# Patient Record
Sex: FEMALE | Race: BLACK OR AFRICAN AMERICAN | NOT HISPANIC OR LATINO | Employment: OTHER | URBAN - METROPOLITAN AREA
[De-identification: names, ages, dates, MRNs, and addresses within clinical notes are randomized per-mention and may not be internally consistent; named-entity substitution may affect disease eponyms.]

---

## 2017-01-30 DIAGNOSIS — G40.901 STATUS EPILEPTICUS: ICD-10-CM

## 2017-01-30 RX ORDER — VIGABATRIN 50 MG/ML
POWDER, FOR SOLUTION ORAL
Refills: 5 | OUTPATIENT
Start: 2017-01-30

## 2017-02-01 ENCOUNTER — TELEPHONE (OUTPATIENT)
Dept: NEUROLOGY | Facility: CLINIC | Age: 27
End: 2017-02-01

## 2017-02-01 NOTE — TELEPHONE ENCOUNTER
----- Message from Tali Ramos sent at 2/1/2017 11:03 AM CST -----  Contact: shari([facility]) [phone]   Shari is calling to get a new prescription for vigabatrin (SABRIL) 500 mg PwPk.pls call

## 2017-02-02 NOTE — TELEPHONE ENCOUNTER
----- Message from Michelle Cabrera MA sent at 2/2/2017  1:40 PM CST -----  Contact: [facility]      ----- Message -----     From: Ruth Samuel     Sent: 2/2/2017   1:06 PM       To: Yuridia GARCIA Staff    Requesting verbal on medication vigabatrin (SABRIL) 500 mg PwPk\    Call: [phone] option 3,2

## 2017-02-21 RX ORDER — LACOSAMIDE 200 MG/1
TABLET, FILM COATED ORAL
Qty: 30 TABLET | OUTPATIENT
Start: 2017-02-21

## 2017-02-23 ENCOUNTER — TELEPHONE (OUTPATIENT)
Dept: NEUROLOGY | Facility: CLINIC | Age: 27
End: 2017-02-23

## 2017-02-23 NOTE — TELEPHONE ENCOUNTER
----- Message from Jessica Fonseca sent at 2/23/2017 11:37 AM CST -----  Contact[facility] [place] [phone]  Pharmacy is calling to get a status on a refill for patients VIMPAT 200 mg Tab.

## 2017-03-07 RX ORDER — CLOBAZAM 20 MG/1
TABLET ORAL
Qty: 60 TABLET | Status: CANCELLED | OUTPATIENT
Start: 2017-03-07

## 2017-03-09 ENCOUNTER — TELEPHONE (OUTPATIENT)
Dept: NEUROLOGY | Facility: CLINIC | Age: 27
End: 2017-03-09

## 2017-03-09 NOTE — TELEPHONE ENCOUNTER
Spoke to Deann at [facility] and refilled Onfi 20mg BID, with 5 refills, per Dr Shi.

## 2017-03-09 NOTE — TELEPHONE ENCOUNTER
----- Message from Michelle Cabrera MA sent at 3/9/2017 11:19 AM CST -----  Contact: soniya([facility]) [phone]      ----- Message -----     From: Tali Ramos     Sent: 3/9/2017  10:36 AM       To: Yuridia GARCIA Staff    soniya is calling for a refill of ONFI 20 mg Tab.pls call

## 2017-04-20 ENCOUNTER — OFFICE VISIT (OUTPATIENT)
Dept: FAMILY MEDICINE | Facility: CLINIC | Age: 27
End: 2017-04-20
Payer: MEDICARE

## 2017-04-20 VITALS
HEART RATE: 94 BPM | WEIGHT: 218.94 LBS | SYSTOLIC BLOOD PRESSURE: 120 MMHG | HEIGHT: 63 IN | TEMPERATURE: 99 F | OXYGEN SATURATION: 98 % | DIASTOLIC BLOOD PRESSURE: 76 MMHG | BODY MASS INDEX: 38.79 KG/M2

## 2017-04-20 DIAGNOSIS — R63.5 WEIGHT GAIN: ICD-10-CM

## 2017-04-20 DIAGNOSIS — Z51.81 THERAPEUTIC DRUG MONITORING: ICD-10-CM

## 2017-04-20 DIAGNOSIS — G40.909 SEIZURE DISORDER: ICD-10-CM

## 2017-04-20 DIAGNOSIS — Z00.00 ROUTINE HEALTH MAINTENANCE: Primary | ICD-10-CM

## 2017-04-20 DIAGNOSIS — N94.6 DYSMENORRHEA: ICD-10-CM

## 2017-04-20 PROCEDURE — 99395 PREV VISIT EST AGE 18-39: CPT | Mod: S$GLB,,, | Performed by: FAMILY MEDICINE

## 2017-04-20 NOTE — MR AVS SNAPSHOT
"    [facility]  [address] [place], [place] [address] [place] [address]  Phone: [phone]  Fax: [phone]                  Deanna Miles   2017 9:00 AM   Office Visit    Description:  Female : 1990   Provider:  Hermilo Macario MD   Department:  [facility]           Reason for Visit     Annual Exam           Diagnoses this Visit        Comments    Routine health maintenance    -  Primary     Dysmenorrhea                To Do List           Goals (5 Years of Data)     None      [facility] On Call     [facility] On Call Nurse Care Line -  Assistance  Unless otherwise directed by your provider, please contact [facility], our nurse care line that is available for  assistance.     Registered nurses in the [facility] provide: appointment scheduling, clinical advisement, health education, and other advisory services.  Call: [phone] (toll free)               Medications           Message regarding Medications     Verify the changes and/or additions to your medication regime listed below are the same as discussed with your clinician today.  If any of these changes or additions are incorrect, please notify your healthcare provider.             Verify that the below list of medications is an accurate representation of the medications you are currently taking.  If none reported, the list may be blank. If incorrect, please contact your healthcare provider. Carry this list with you in case of emergency.           Current Medications     ONFI 20 mg Tab Take 1 tablet (20 mg total) by mouth 2 (two) times daily.    vigabatrin (SABRIL) 500 mg PwPk 3 each (1,500 mg total) by Per G Tube route 2 (two) times daily.    VIMPAT 200 mg Tab TAKE 1/2 TABLET BY MOUTH 2 TIMES A DAY           Clinical Reference Information           Your Vitals Were     BP Pulse Temp Height Weight SpO2    120/76 94 98.5 °F (36.9 °C) (Oral) 5' 3" (1.6 m) 99.3 kg (218 lb 14.7 oz) 98%    BMI             "    38.78 kg/m2          Blood Pressure          Most Recent Value    BP  120/76      Allergies as of 4/20/2017     Claritin [Loratadine]    Milk Containing Products      Immunizations Administered on Date of Encounter - 4/20/2017     None      Orders Placed During Today's Visit      Normal Orders This Visit    Ambulatory referral to Obstetrics / Gynecology       [facility] Sign-Up     Activating your [facility] account is as easy as 1-2-3!     1) Visit [email or web address], select Sign Up Now, enter this activation code and your date of birth, then select Next.  [email or web address]  Expires: 6/4/2017  9:54 AM      2) Create a username and password to use when you visit [facility] in the future and select a security question in case you lose your password and select Next.    3) Enter your e-mail address and click Sign Up!    Additional Information  If you have questions, please e-mail [email or web address] or call [phone] to talk to our [facility] staff. Remember, [facility] is NOT to be used for urgent needs. For medical emergencies, dial 911.         Language Assistance Services     ATTENTION: Language assistance services are available, free of charge. Please call [phone].      ATENCIÓN: Si juan hazel, tiene a jimenes disposición servicios gratuitos de asistencia lingüística. Llame al [phone].     CHÚ Ý: N?u b?n nói Ti?ng Vi?t, có các d?ch v? h? tr? ngôn ng? mi?n phí dành cho b?n. G?i s? [phone].         [facility] complies with applicable Federal civil rights laws and does not discriminate on the basis of race, color, national origin, age, disability, or sex.

## 2017-04-21 ENCOUNTER — TELEPHONE (OUTPATIENT)
Dept: FAMILY MEDICINE | Facility: CLINIC | Age: 27
End: 2017-04-21

## 2017-04-21 LAB
ALBUMIN SERPL-MCNC: 3.9 G/DL (ref 3.6–5.1)
ALBUMIN/GLOB SERPL: 1.5 (CALC) (ref 1–2.5)
ALP SERPL-CCNC: 69 U/L (ref 33–115)
ALT SERPL-CCNC: 6 U/L (ref 6–29)
AST SERPL-CCNC: 14 U/L (ref 10–30)
BASOPHILS # BLD AUTO: 20 CELLS/UL (ref 0–200)
BASOPHILS NFR BLD AUTO: 0.3 %
BILIRUB SERPL-MCNC: 0.2 MG/DL (ref 0.2–1.2)
BUN SERPL-MCNC: 6 MG/DL (ref 7–25)
BUN/CREAT SERPL: 10 (CALC) (ref 6–22)
CALCIUM SERPL-MCNC: 8.9 MG/DL (ref 8.6–10.2)
CHLORIDE SERPL-SCNC: 106 MMOL/L (ref 98–110)
CHOLEST SERPL-MCNC: 149 MG/DL (ref 125–200)
CHOLEST/HDLC SERPL: 4 (CALC)
CO2 SERPL-SCNC: 26 MMOL/L (ref 20–31)
CREAT SERPL-MCNC: 0.6 MG/DL (ref 0.5–1.1)
EOSINOPHIL # BLD AUTO: 99 CELLS/UL (ref 15–500)
EOSINOPHIL NFR BLD AUTO: 1.5 %
ERYTHROCYTE [DISTWIDTH] IN BLOOD BY AUTOMATED COUNT: 12.8 % (ref 11–15)
GFR SERPL CREATININE-BSD FRML MDRD: 127 ML/MIN/1.73M2
GLOBULIN SER CALC-MCNC: 2.6 G/DL (CALC) (ref 1.9–3.7)
GLUCOSE SERPL-MCNC: 86 MG/DL (ref 65–99)
HCT VFR BLD AUTO: 37.6 % (ref 35–45)
HDLC SERPL-MCNC: 37 MG/DL
HGB BLD-MCNC: 12.6 G/DL (ref 11.7–15.5)
LDLC SERPL CALC-MCNC: 93 MG/DL (CALC)
LYMPHOCYTES # BLD AUTO: 1637 CELLS/UL (ref 850–3900)
LYMPHOCYTES NFR BLD AUTO: 24.8 %
MCH RBC QN AUTO: 30.5 PG (ref 27–33)
MCHC RBC AUTO-ENTMCNC: 33.6 G/DL (ref 32–36)
MCV RBC AUTO: 90.7 FL (ref 80–100)
MONOCYTES # BLD AUTO: 475 CELLS/UL (ref 200–950)
MONOCYTES NFR BLD AUTO: 7.2 %
NEUTROPHILS # BLD AUTO: 4369 CELLS/UL (ref 1500–7800)
NEUTROPHILS NFR BLD AUTO: 66.2 %
NONHDLC SERPL-MCNC: 112 MG/DL (CALC)
PLATELET # BLD AUTO: 366 THOUSAND/UL (ref 140–400)
PMV BLD REES-ECKER: 8.7 FL (ref 7.5–12.5)
POTASSIUM SERPL-SCNC: 4 MMOL/L (ref 3.5–5.3)
PROLACTIN SERPL-MCNC: 11.6 NG/ML
PROT SERPL-MCNC: 6.5 G/DL (ref 6.1–8.1)
RBC # BLD AUTO: 4.14 MILLION/UL (ref 3.8–5.1)
SODIUM SERPL-SCNC: 139 MMOL/L (ref 135–146)
T4 FREE SERPL-MCNC: 0.8 NG/DL (ref 0.8–1.8)
TRIGL SERPL-MCNC: 97 MG/DL
TSH SERPL-ACNC: 1.65 MIU/L
WBC # BLD AUTO: 6.6 THOUSAND/UL (ref 3.8–10.8)

## 2017-04-21 NOTE — TELEPHONE ENCOUNTER
Informed patient's mother, Mrs. Viv Page, that lab work was normal.     Mrs. Page says she scheduled Ms. Miles to see Dr. Haines in [place] but if something in [place] is available she will take it. I informed her that Dr. Haines's staff will be in contact with her.

## 2017-04-21 NOTE — TELEPHONE ENCOUNTER
Please call her mother Mrs. Nam and let her know that Timothy's  blood work is normal.  I also sent a note to Dr. Haines-about seeing her and helping her with menstrual periods.

## 2017-04-21 NOTE — PROGRESS NOTES
Patient ID: Deanna Miles is a 26 y.o. female.    Chief Complaint: Annual Exam (90L form)    HPI      Deanna Milse is a 26 y.o. female  here for annual exam.  Patient with profound autism-seizure disorder, significant vision loss and history of GI bleed in the distant past.  Currently she has any weight recently without significant dietary increase.  Eats really only she is given has some neck symptoms with meals consisting effort            Review of Symptoms    Constitutional  Neg activity change, No chills /fever   Resp  Neg hemoptysis, stridor, choking  [facility]  Neg chest pain, palpitations    Physical Exam    Constitutional:    Sitting comfortably-will not talkative but will respond to questions not always appropriate.  No distress.      HENT  Head:  Mild cranial deformity and atraumatic  Right Ear: External ear normal.   Left Ear: External ear normal.   Nose: External nose normal.   Mouth:  Moist mucus membranes.    Eyes:  Conjunctivae are normal. Right eye exhibits no discharge.  Left eye exhibits no discharge. No scleral icterus.  No periorbital edema    Cardiovascular:  Regular rate, Regular rhythm     No extrasystole  Normal S1-S2      Pulmonary/Chest:   Normal effort and breath sounds.  No wheezing, rhonchi or rales.    Musculoskeletal:  No edema.  No waisting   contractions distal upper extremity   Continued voluntary movement    Neurological: Patient is alert oriented to person  Abnormal coordination     Skin:   Skin is warm and dry.  No diaphoresis.   No rash noted.     Psychiatric: Normal mood and affect. Behavior is normal.  Judgment and thought content normal.       Assessment / Plan:      ICD-10-CM ICD-9-CM   1. Routine health maintenance Z00.00 V70.0   2. Dysmenorrhea N94.6 625.3   3. Seizure disorder G40.909 345.90   4. Weight gain R63.5 783.1   5. Therapeutic drug monitoring Z51.81 V58.83     Routine health maintenance  -     Ambulatory referral to Obstetrics / Gynecology  -     [facility] auto  differential; Future; Expected date: 4/20/17  -     Comprehensive metabolic panel; Future; Expected date: 4/20/17  -     TSH; Future  -     Lipid panel; Future; Expected date: 4/20/17  -     Prolactin; Future; Expected date: 4/20/17  -     T4, free; Future; Expected date: 4/20/17    Dysmenorrhea  -     Ambulatory referral to Obstetrics / Gynecology  -     CBC auto differential; Future; Expected date: 4/20/17  -     Comprehensive metabolic panel; Future; Expected date: 4/20/17  -     TSH; Future  -     Lipid panel; Future; Expected date: 4/20/17  -     Prolactin; Future; Expected date: 4/20/17  -     T4, free; Future; Expected date: 4/20/17    Seizure disorder  -     CBC auto differential; Future; Expected date: 4/20/17  -     Comprehensive metabolic panel; Future; Expected date: 4/20/17  -     TSH; Future  -     Lipid panel; Future; Expected date: 4/20/17  -     Prolactin; Future; Expected date: 4/20/17  -     T4, free; Future; Expected date: 4/20/17    Weight gain  -     CBC auto differential; Future; Expected date: 4/20/17  -     Comprehensive metabolic panel; Future; Expected date: 4/20/17  -     TSH; Future  -     Lipid panel; Future; Expected date: 4/20/17  -     Prolactin; Future; Expected date: 4/20/17  -     T4, free; Future; Expected date: 4/20/17    Therapeutic drug monitoring  -     CBC auto differential; Future; Expected date: 4/20/17  -     Comprehensive metabolic panel; Future; Expected date: 4/20/17  -     TSH; Future  -     Lipid panel; Future; Expected date: 4/20/17  -     Prolactin; Future; Expected date: 4/20/17  -     T4, free; Future; Expected date: 4/20/17

## 2017-04-21 NOTE — TELEPHONE ENCOUNTER
Jesus,    I referred this young lady to you.  She has profound autism and menstrual periods cause a fair amount problems because of her lack of understanding.  In discussions with her caregiver, she was on Depo-Provera but continued to have spotting so it was discontinued.  I sent her to you for evaluation and consideration of other methods to control menstrual cycle.  I mentioned Mirena but this may need to be done under some sedation.    Thank you      Eleuterio

## 2017-04-23 LAB — LACOSAMIDE SERPL-MCNC: 3.3 MCG/ML

## 2017-04-26 ENCOUNTER — OFFICE VISIT (OUTPATIENT)
Dept: OBSTETRICS AND GYNECOLOGY | Facility: CLINIC | Age: 27
End: 2017-04-26
Payer: MEDICARE

## 2017-04-26 VITALS
BODY MASS INDEX: 39.16 KG/M2 | SYSTOLIC BLOOD PRESSURE: 118 MMHG | WEIGHT: 221 LBS | HEIGHT: 63 IN | DIASTOLIC BLOOD PRESSURE: 74 MMHG

## 2017-04-26 DIAGNOSIS — Z30.014 ENCOUNTER FOR INITIAL PRESCRIPTION OF INTRAUTERINE CONTRACEPTIVE DEVICE (IUD): Primary | ICD-10-CM

## 2017-04-26 PROCEDURE — 99203 OFFICE O/P NEW LOW 30 MIN: CPT | Mod: S$GLB,,, | Performed by: OBSTETRICS & GYNECOLOGY

## 2017-04-26 PROCEDURE — 99499 UNLISTED E&M SERVICE: CPT | Mod: S$PBB,,, | Performed by: OBSTETRICS & GYNECOLOGY

## 2017-04-26 PROCEDURE — 99999 PR PBB SHADOW E&M-EST. PATIENT-LVL II: CPT | Mod: PBBFAC,,, | Performed by: OBSTETRICS & GYNECOLOGY

## 2017-04-26 PROCEDURE — 1160F RVW MEDS BY RX/DR IN RCRD: CPT | Mod: S$GLB,,, | Performed by: OBSTETRICS & GYNECOLOGY

## 2017-04-26 NOTE — PROGRESS NOTES
PT here to discuss other birth control options. Pt is severely autistic and will not ann exam w/o anesthesia. Mon is with pt and didn't like wt gain with depo so has been off of ti for > 1yr. Pt does not handle vb with cycles well and mom is requesting a mirena. Mom want to try the Mirena  No h/o STD's/ PID, never been sexually active        Will order Mirena and place once rec'd. Pt will call to schedule   will send Urine for GC/CT  At preop visit

## 2017-05-03 ENCOUNTER — TELEPHONE (OUTPATIENT)
Dept: FAMILY MEDICINE | Facility: CLINIC | Age: 27
End: 2017-05-03

## 2017-05-03 NOTE — TELEPHONE ENCOUNTER
----- Message from Earline Braxton sent at 5/3/2017  1:15 PM CDT -----  Contact: Eleanor Joel/ [phone]  Please see 90 L form in media listed under Medical information form 04/20/2017. Nurse needs clarity on diagnosis written on the form. (states she can't make out what's written). Please call

## 2017-05-03 NOTE — TELEPHONE ENCOUNTER
i called taylor arora and luis antonio was not available  i re faxed form with clarification written on it to [phone]

## 2017-05-05 ENCOUNTER — PATIENT MESSAGE (OUTPATIENT)
Dept: OBSTETRICS AND GYNECOLOGY | Facility: CLINIC | Age: 27
End: 2017-05-05

## 2017-05-08 ENCOUNTER — TELEPHONE (OUTPATIENT)
Dept: FAMILY MEDICINE | Facility: CLINIC | Age: 27
End: 2017-05-08

## 2017-05-08 NOTE — TELEPHONE ENCOUNTER
"----- Message from Josefina Portillo sent at 5/8/2017  1:56 PM CDT -----  Contact: Eleanor briggs/Sneha Joel # [phone]  Says she received the 90L paperwork. Page 2 has a note saying "see attached snap shot" but there is no snap shot attached. Please fax to [phone]  "

## 2017-05-16 ENCOUNTER — OFFICE VISIT (OUTPATIENT)
Dept: OPTOMETRY | Facility: CLINIC | Age: 27
End: 2017-05-16
Payer: MEDICARE

## 2017-05-16 ENCOUNTER — TELEPHONE (OUTPATIENT)
Dept: OBSTETRICS AND GYNECOLOGY | Facility: CLINIC | Age: 27
End: 2017-05-16

## 2017-05-16 DIAGNOSIS — Z79.899 ENCOUNTER FOR LONG-TERM (CURRENT) USE OF HIGH-RISK MEDICATION: ICD-10-CM

## 2017-05-16 DIAGNOSIS — H10.13 ALLERGIC CONJUNCTIVITIS, BILATERAL: Primary | ICD-10-CM

## 2017-05-16 PROCEDURE — 99999 PR PBB SHADOW E&M-EST. PATIENT-LVL II: CPT | Mod: PBBFAC,,, | Performed by: OPTOMETRIST

## 2017-05-16 PROCEDURE — 92014 COMPRE OPH EXAM EST PT 1/>: CPT | Mod: S$GLB,,, | Performed by: OPTOMETRIST

## 2017-05-16 RX ORDER — FLUTICASONE PROPIONATE 50 MCG
1 SPRAY, SUSPENSION (ML) NASAL DAILY
Qty: 9.9 BOTTLE | Refills: 6
Start: 2017-05-16 | End: 2018-03-09

## 2017-05-16 NOTE — MR AVS SNAPSHOT
[facility]  [address]  [place] [address]  Phone: [phone]                  Deanna Miles   2017 3:00 PM   Office Visit    Description:  Female : 1990   Provider:  Santy Lobo OD   Department:  [facility]           Reason for Visit     Concerns About Ocular Health           Diagnoses this Visit        Comments    Allergic conjunctivitis, bilateral    -  Primary     Encounter for long-term (current) use of high-risk medication                To Do List           Goals (5 Years of Data)     None       These Medications        Disp Refills Start End    fluticasone (FLONASE) 50 mcg/actuation nasal spray 9.9 Bottle 6 2017     1 spray by Each Nare route once daily. - Each Nare    Pharmacy: [facility]/pharmacy #[id number] - [place] - [address] AT [facility] Ph #: [phone]         [facility] On Call     [facility] On Call Nurse Care Line -  Assistance  Unless otherwise directed by your provider, please contact [facility], our nurse care line that is available for  assistance.     Registered nurses in the [facility] provide: appointment scheduling, clinical advisement, health education, and other advisory services.  Call: [phone] (toll free)               Medications           Message regarding Medications     Verify the changes and/or additions to your medication regime listed below are the same as discussed with your clinician today.  If any of these changes or additions are incorrect, please notify your healthcare provider.        START taking these NEW medications        Refills    fluticasone (FLONASE) 50 mcg/actuation nasal spray 6    Si spray by Each Nare route once daily.    Class: No Print    Route: Each Nare           Verify that the below list of medications is an accurate representation of the medications you are currently taking.  If none reported, the list may be blank. If  incorrect, please contact your healthcare provider. Carry this list with you in case of emergency.           Current Medications     ONFI 20 mg Tab Take 1 tablet (20 mg total) by mouth 2 (two) times daily.    VIMPAT 200 mg Tab TAKE 1/2 TABLET BY MOUTH 2 TIMES A DAY    fluticasone (FLONASE) 50 mcg/actuation nasal spray 1 spray by Each Nare route once daily.    vigabatrin (SABRIL) 500 mg PwPk 3 each (1,500 mg total) by Per G Tube route 2 (two) times daily.           Clinical Reference Information           Your Vitals Were     Last Period                   04/05/2017           Allergies as of 5/16/2017     Claritin [Loratadine]    Milk Containing Products      Immunizations Administered on Date of Encounter - 5/16/2017     None      Language Assistance Services     ATTENTION: Language assistance services are available, free of charge. Please call [phone].      ATENCIÓN: Si juan hazel, tiene a jimenes disposición servicios gratuitos de asistencia lingüística. Llame al [phone].     BARRY Ý: N?u b?n nói Ti?ng Vi?t, có các d?ch v? h? tr? ngôn ng? mi?n phí dành cho b?n. G?i s? [phone].         [facility] complies with applicable Federal civil rights laws and does not discriminate on the basis of race, color, national origin, age, disability, or sex.

## 2017-05-16 NOTE — TELEPHONE ENCOUNTER
----- Message from Kierra Reid sent at 5/15/2017  1:25 PM CDT -----  Contact: [facility] [phone]   Pharmacy would like to speak with you about prior authorization for patient's Mirena IUD . Prior auth can be obtained by calling [phone].  Please advise.

## 2017-05-17 NOTE — TELEPHONE ENCOUNTER
----- Message from Yris Holt sent at 5/17/2017  9:22 AM CDT -----  Contact: [phone]  Pt requesting a nurse call back in regards to her next step. Please advise.

## 2017-05-19 ENCOUNTER — TELEPHONE (OUTPATIENT)
Dept: OBSTETRICS AND GYNECOLOGY | Facility: CLINIC | Age: 27
End: 2017-05-19

## 2017-05-19 NOTE — TELEPHONE ENCOUNTER
----- Message from Erin Potts sent at 5/19/2017 10:39 AM CDT -----  Contact: Paola from [facility] [phone]  Paola from [facility] would like prior authorization of Selena. [facility] would like call [phone] for authorization. Please advise.

## 2017-05-30 ENCOUNTER — TELEPHONE (OUTPATIENT)
Dept: OBSTETRICS AND GYNECOLOGY | Facility: CLINIC | Age: 27
End: 2017-05-30

## 2017-05-30 NOTE — TELEPHONE ENCOUNTER
----- Message from Tiffany Hagen sent at 5/30/2017  1:19 PM CDT -----  Contact: ms. renee [phone]  This is regarding patient's IUD  ----- Message -----  From: Mariama Lyon  Sent: 5/30/2017  12:48 PM  To: Tiffany Hagen    Ms renee would like to speak with you regarding insurance approval for patient's procedure

## 2017-06-01 ENCOUNTER — TELEPHONE (OUTPATIENT)
Dept: OBSTETRICS AND GYNECOLOGY | Facility: CLINIC | Age: 27
End: 2017-06-01

## 2017-06-02 NOTE — TELEPHONE ENCOUNTER
----- Message from Tiffany Hagen sent at 6/2/2017 11:32 AM CDT -----  Contact: Self [phone]      ----- Message -----  From: Elvira Leary  Sent: 6/2/2017  11:27 AM  To: Tiffany Hagen    Patients mother is calling to talk to you concerning some paper work she has to sign, please advice

## 2017-07-05 ENCOUNTER — TELEPHONE (OUTPATIENT)
Dept: OBSTETRICS AND GYNECOLOGY | Facility: CLINIC | Age: 27
End: 2017-07-05

## 2017-07-11 ENCOUNTER — TELEPHONE (OUTPATIENT)
Dept: OBSTETRICS AND GYNECOLOGY | Facility: CLINIC | Age: 27
End: 2017-07-11

## 2017-07-17 ENCOUNTER — TELEPHONE (OUTPATIENT)
Dept: OBSTETRICS AND GYNECOLOGY | Facility: CLINIC | Age: 27
End: 2017-07-17

## 2017-07-26 ENCOUNTER — TELEPHONE (OUTPATIENT)
Dept: OBSTETRICS AND GYNECOLOGY | Facility: CLINIC | Age: 27
End: 2017-07-26

## 2017-07-26 NOTE — TELEPHONE ENCOUNTER
----- Message from Cassandra Nicole sent at 7/25/2017 12:35 PM CDT -----  Contact: [facility]/ [phone]  Pharmacy would like to speak with you about the application for the IUD assistance program. Pharmacy also needs the patient's income and how many people live in the household. Fax number is [phone]. Please advise.

## 2017-07-31 ENCOUNTER — TELEPHONE (OUTPATIENT)
Dept: OBSTETRICS AND GYNECOLOGY | Facility: CLINIC | Age: 27
End: 2017-07-31

## 2017-07-31 NOTE — TELEPHONE ENCOUNTER
----- Message from James Valdes sent at 7/31/2017  2:14 PM CDT -----  Contact: Brandi briggs/[facility] [phone]  ID#[id number]  Pharmacy states this is their 2nd call.  Pharmacy would like to speak with you about the application for the IUD assistance program. Please advise.

## 2017-08-24 RX ORDER — LACOSAMIDE 200 MG/1
TABLET, FILM COATED ORAL
Qty: 30 TABLET | OUTPATIENT
Start: 2017-08-24

## 2017-08-29 ENCOUNTER — TELEPHONE (OUTPATIENT)
Dept: NEUROLOGY | Facility: CLINIC | Age: 27
End: 2017-08-29

## 2017-09-21 ENCOUNTER — LAB VISIT (OUTPATIENT)
Dept: LAB | Facility: HOSPITAL | Age: 27
End: 2017-09-21
Attending: PSYCHIATRY & NEUROLOGY
Payer: MEDICARE

## 2017-09-21 ENCOUNTER — OFFICE VISIT (OUTPATIENT)
Dept: NEUROLOGY | Facility: CLINIC | Age: 27
End: 2017-09-21
Payer: MEDICARE

## 2017-09-21 VITALS
DIASTOLIC BLOOD PRESSURE: 78 MMHG | BODY MASS INDEX: 37.15 KG/M2 | HEIGHT: 63 IN | HEART RATE: 101 BPM | SYSTOLIC BLOOD PRESSURE: 114 MMHG | WEIGHT: 209.69 LBS

## 2017-09-21 DIAGNOSIS — N39.0 CHRONIC UTI (URINARY TRACT INFECTION): ICD-10-CM

## 2017-09-21 DIAGNOSIS — N39.0 CHRONIC UTI (URINARY TRACT INFECTION): Primary | ICD-10-CM

## 2017-09-21 DIAGNOSIS — R56.9 SEIZURE: Primary | ICD-10-CM

## 2017-09-21 LAB
BACTERIA #/AREA URNS AUTO: ABNORMAL /HPF
BILIRUB UR QL STRIP: NEGATIVE
CLARITY UR REFRACT.AUTO: ABNORMAL
COLOR UR AUTO: YELLOW
GLUCOSE UR QL STRIP: NEGATIVE
HGB UR QL STRIP: ABNORMAL
HYALINE CASTS UR QL AUTO: 0 /LPF
KETONES UR QL STRIP: NEGATIVE
LEUKOCYTE ESTERASE UR QL STRIP: ABNORMAL
MICROSCOPIC COMMENT: ABNORMAL
NITRITE UR QL STRIP: NEGATIVE
PH UR STRIP: 5 [PH] (ref 5–8)
PROT UR QL STRIP: ABNORMAL
RBC #/AREA URNS AUTO: 3 /HPF (ref 0–4)
SP GR UR STRIP: 1.03 (ref 1–1.03)
SQUAMOUS #/AREA URNS AUTO: 12 /HPF
URN SPEC COLLECT METH UR: ABNORMAL
UROBILINOGEN UR STRIP-ACNC: NEGATIVE EU/DL
WBC #/AREA URNS AUTO: 92 /HPF (ref 0–5)
WBC CLUMPS UR QL AUTO: ABNORMAL

## 2017-09-21 PROCEDURE — 99999 PR PBB SHADOW E&M-EST. PATIENT-LVL III: CPT | Mod: PBBFAC,,, | Performed by: PSYCHIATRY & NEUROLOGY

## 2017-09-21 PROCEDURE — 99499 UNLISTED E&M SERVICE: CPT | Mod: S$PBB,,, | Performed by: PSYCHIATRY & NEUROLOGY

## 2017-09-21 PROCEDURE — 3008F BODY MASS INDEX DOCD: CPT | Mod: S$GLB,,, | Performed by: PSYCHIATRY & NEUROLOGY

## 2017-09-21 PROCEDURE — 81001 URINALYSIS AUTO W/SCOPE: CPT

## 2017-09-21 PROCEDURE — 99214 OFFICE O/P EST MOD 30 MIN: CPT | Mod: S$GLB,,, | Performed by: PSYCHIATRY & NEUROLOGY

## 2017-09-21 NOTE — PROGRESS NOTES
"Progress Notes                                       INTERVAL HISTORY:    9/21/17:   Only occasional small staring spell  No significant sz  Mom worried she may have UTI now  Doing well with sz control so far      3/9/16:  Doing great as far as seizure control.  Has been gaining a lot of weight.  Otherwise no complaints        CURRENT meds:  Sabril 1500mg BID  LCM 200mg BID  Onfi 20mg BID     _______________________________________________________________________________________     Laureen Brown MD at 7/15/2014 11:32 AM   Two ED Visits 5/31/14 and 6/2/14 for recurrent seizure activity.     Currently on:  Sabril 1500mg BID  LCM 200mg BID  LEV [phone]  7/7/14: 18     Pt very lethargic during the daytime.        4/09/14  Mom increased sabril. On LEV and LCM.  No more seizures per mom  Mom complains of insomnia- pt had problem with this before.     Sabril 1500mg BID  LCM 200mg BID  LEV [phone]     EEG showed bifrontal slowing        2/20/14  During [facility] stay, the neurologist weaned her off the onfi and decreased her sabril to 1000mg/day.     She had episodes of shaking, eyes roving, head turn to the left after discharge from rehab. Three days after d/c, she pulled her feeding tube out and had seizures in the [place] ER. She didn't receive any benzos in the ER. Mom put her back on two packets BID (500mg in each packet). Yesterday, she had another seizure for 2 minutes.      Patient with insomnia at night. She takes quick naps during the day.      Seen by ophthalmology- couldn't find note. Normal exam reportedly.      Current AED:  Sabril 1000mg BID  LCM 200mg BID  LEV 1000 mg qid     HISTORY OF PRESENT ILLNESS (HPI):   "The patient is a 23 y.o. yo right handed AA woman with autism and a remote history of epilepsy who is being evaluated for NCSE. Consultation was requested by Dr. Olivo.   Ms. Miles developed seizures at 1 month. She was placed on PB and continued to have seizures (febrile?). Last known " "seizure was at the age of 6. Her adoptive mother says that seizure type consisted of head jerking back and biting on her lip. She was weaned of PB at the age of 6, but did nto have recurrence since that time.   Her mother noticed staring spells that began in sep 2013. Ms. Miles is non-verbal at baseline, but communicates with non-verbal gestures. During these spells, she would stare off without any associated automatisms. These lasted 1 minute and occurred 1x/week.   Yesterday at 6pm, the patient had three seizure clusters at home (generalized likely) and had two more in the hospital. She was given valium and ativan, intubated and transferred to [facility]. Her first seizure began with hand fumbling and decreased interaction, this then progressed to head and extremity jerking.   At the OSH, she had an LP (normal)."     During the hospitalization she was started on onfi, sabril, viimpat and keppra. She also was on ketamine for seizure control.           Allergies    Allergen  Reactions      Milk Containing Products  Diarrhea              Current Medications            Current Outpatient Prescriptions    Medication  Sig  Dispense  Refill      lacosamide (VIMPAT) 200 mg Tab  Take 1 tablet (200 mg total) by mouth 2 (two) times daily.  60 tablet  11      levetiracetam (KEPPRA) 1000 MG tablet  Take 1000mg by mouth in the morning   Take 2000mg by mouth in the evening  120 tablet  11      MELATONIN ORAL  Take by mouth.          vigabatrin (SABRIL) 500 mg PwPk  3 each (1,500 mg total) by Per G Tube route 2 (two) times daily.  180 each  11    No current facility-administered medications for this visit.                Past Medical History    Diagnosis  Date      Autism disorder        Seizures        Encounter for blood transfusion        Strabismus               Past Surgical History    Procedure  Laterality  Date      Strabismus surgery                 Family History    Problem  Relation  Age of Onset      Diabetes  " Father        Hypertension  Father        Cataracts  Maternal Aunt        Diabetes  Paternal Aunt        Diabetes  Paternal Uncle        Diabetes  Paternal Grandmother        Hypertension  Paternal Grandmother        Amblyopia  Neg Hx        Blindness  Neg Hx        Cancer  Neg Hx        Glaucoma  Neg Hx        Macular degeneration  Neg Hx        Retinal detachment  Neg Hx        Strabismus  Neg Hx        Stroke  Neg Hx        Thyroid disease  Neg Hx                Social History               History    Social History      Marital Status:  Single        Spouse Name:  N/A        Number of Children:  N/A      Years of Education:  N/A    Occupational History       Not on file.     Social History Main Topics      Smoking status:  Never Smoker      Smokeless tobacco:  Not on file      Alcohol Use:  No      Drug Use:  No      Sexually Active:  Not on file    Other Topics  Concern        Not on file      Social History Narrative      22 yo Female developmental delayedNon-verbal                   Filed Vitals:      07/15/14 1116    BP:  120/79    Pulse:  79          Higher Cortical Function:   Patient is a well developed, pleasant, well groomed individual appearing their stated age   Awake, alert, smiles  Doesn't follow any commands  Cranial Nerves II - XII:   EOMs with exotropia in left eye  PERRLA. D/C Funduscopic exam - unable to perform,  Blinks to threat  Motor - facial movement was symmetrical and normal.   Motor: Power, bulk and tone were decreased in all extremities.   Sensory: responds to LT  Coordination:   Unable to assess.   Gait: gait is slow an wide-based.  Deep tendon reflexes: 2+ throughtout  Tremor: resting, postural, intentional - none   Pulses   Carotids - strong without bruits   Peripheral - strong and symmetrical   CV- RRR      3/22/14 EEG routine:  Findings   The patient's background consists of a posteriorly dominant 11 Hz alpha rhythm, which is moderately well formed,  moderately well modulated, and abolishes with eye opening. Anteriorly, the patient's background consists of admixed alpha, beta, and theta range frequencies. Episodes lasting up to 5 seconds of 6 Hz relatively high amplitude slowing are appreciated bifrontally.   During the course of the recording, the patient is noted to be awake, and subsequently becomes drowsy. Normal sleep architecture is not appreciated.   Hyperventilation and photic stimulation were not performed.   There are no focal, lateralized, or epileptiform transients appreciated in this record.   EKG demonstrates sinus rhythm.   Interpretation   This is an abnormal EEG due to the presence of episodic bifrontal slowing. 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      Impression  Refractory seizures, unow well controlled on sabril, LCM, Onfi  Sabril needs to be continued to prevent aggressive seizures (only drug that stopped her status)  EEG routine c/w generalized slow  MRI shows left MTS - would not repeat MRI at this time     History of status epilepticus, NCSE     Autism, moderate-severe MR     Plan:  1. Continue vimpat 200mg twice a day  2. Continue Sabril 1500mg twice a day  3. Continue Onfi 20mg BID  4. RTC 6 months  5. Check labs, U/A

## 2017-09-21 NOTE — LETTER
September 21, 2017      Hermilo Macario MD  [address] [facility] [address]           [facility] - Neurology Epilepsy  [address], 7th [address]  [facility] [address]  Phone: [phone]  Fax: [phone]          Patient: Deanna Miles   MR Number: [id number]   YOB: 1990   Date of Visit: 9/21/2017       Dear Dr. Hermilo Macario:    Thank you for referring Deanna Miles to me for evaluation. Attached you will find relevant portions of my assessment and plan of care.    If you have questions, please do not hesitate to call me. I look forward to following Deanna Miles along with you.    Sincerely,    Sedrick Shi MD    Enclosure  CC:  No Recipients    If you would like to receive this communication electronically, please contact [email or web address] or [phone] to request more information on [employer] Link access.    For providers and/or their staff who would like to refer a patient to [facility], please contact us through our one-stop-shop provider referral line, [facility], at [phone].    If you feel you have received this communication in error or would no longer like to receive these types of communications, please e-mail [email or web address]

## 2017-09-22 ENCOUNTER — PATIENT MESSAGE (OUTPATIENT)
Dept: FAMILY MEDICINE | Facility: CLINIC | Age: 27
End: 2017-09-22

## 2017-09-22 RX ORDER — AMOXICILLIN 500 MG/1
500 TABLET, FILM COATED ORAL EVERY 12 HOURS
Qty: 20 TABLET | Refills: 0 | Status: SHIPPED | OUTPATIENT
Start: 2017-09-22 | End: 2017-10-02

## 2017-10-06 RX ORDER — CLOBAZAM 20 MG/1
TABLET ORAL
Qty: 60 TABLET | Status: CANCELLED | OUTPATIENT
Start: 2017-10-06

## 2017-10-10 ENCOUNTER — TELEPHONE (OUTPATIENT)
Dept: NEUROLOGY | Facility: CLINIC | Age: 27
End: 2017-10-10

## 2017-10-10 NOTE — TELEPHONE ENCOUNTER
----- Message from Holly Scott sent at 10/10/2017  2:52 PM CDT -----  Contact: [facility] specialty pharmacy     [phone] option 3  Calling to request a new prescription for vigabatrin (SABRIL) 500 mg in pill form.  pls call with a verbal or fax to [phone].

## 2017-10-13 ENCOUNTER — NURSE TRIAGE (OUTPATIENT)
Dept: ADMINISTRATIVE | Facility: CLINIC | Age: 27
End: 2017-10-13

## 2017-10-13 NOTE — TELEPHONE ENCOUNTER
"    Reason for Disposition   [1] Request for URGENT new prescription or refill of "essential" medication (i.e., likelihood of harm to patient if not taken) AND [2] triager unable to fill per unit policy    Protocols used: ST MEDICATION QUESTION CALL-A-    Caregiver called for refill of ONFI. Dr. Albrecht on call provider notified via Pc. Mother and Dr. Albrecht conferenced in on call together with triager. Mother request if she cant get refill can she break pill in half. MD states ok. Patient instructed to call Neurology clinic on Monday for refill. Can you please advise patient on refill?   "

## 2017-10-20 ENCOUNTER — TELEPHONE (OUTPATIENT)
Dept: NEUROLOGY | Facility: CLINIC | Age: 27
End: 2017-10-20

## 2017-10-20 NOTE — TELEPHONE ENCOUNTER
[facility] Sabril [facility], mother requested tablets instead of powder packets.

## 2017-10-20 NOTE — TELEPHONE ENCOUNTER
----- Message from Katiuska Parsons sent at 10/20/2017 10:31 AM CDT -----  Contact: Elvira, [facility]  Ms. Felix is calling regarding a new prescription of Sabril.  Mother is requesting tablets, in lieu of the powder.  The order is scheduled for Tuesday, 10/24/17.    She can be reached at [phone], Option 3 on the Menu.  Fax # is:  [phone].    Thank you.

## 2017-12-01 ENCOUNTER — PATIENT MESSAGE (OUTPATIENT)
Dept: OBSTETRICS AND GYNECOLOGY | Facility: CLINIC | Age: 27
End: 2017-12-01

## 2017-12-01 NOTE — TELEPHONE ENCOUNTER
Contacted pt. Pt. Mother answered and wanted to schedule an appointment for pt to get depo. Pt scheduled for 12/20/17 at 8:45. Patients mother verbalized understanding.

## 2017-12-22 ENCOUNTER — PATIENT MESSAGE (OUTPATIENT)
Dept: OBSTETRICS AND GYNECOLOGY | Facility: CLINIC | Age: 27
End: 2017-12-22

## 2018-02-28 ENCOUNTER — PATIENT MESSAGE (OUTPATIENT)
Dept: OBSTETRICS AND GYNECOLOGY | Facility: CLINIC | Age: 28
End: 2018-02-28

## 2018-02-28 ENCOUNTER — PATIENT MESSAGE (OUTPATIENT)
Dept: FAMILY MEDICINE | Facility: CLINIC | Age: 28
End: 2018-02-28

## 2018-03-09 ENCOUNTER — OFFICE VISIT (OUTPATIENT)
Dept: OBSTETRICS AND GYNECOLOGY | Facility: CLINIC | Age: 28
End: 2018-03-09
Payer: MEDICARE

## 2018-03-09 VITALS
SYSTOLIC BLOOD PRESSURE: 102 MMHG | HEIGHT: 63 IN | DIASTOLIC BLOOD PRESSURE: 74 MMHG | WEIGHT: 206.63 LBS | BODY MASS INDEX: 36.61 KG/M2

## 2018-03-09 DIAGNOSIS — Z30.9 ENCOUNTER FOR CONTRACEPTIVE MANAGEMENT, UNSPECIFIED TYPE: ICD-10-CM

## 2018-03-09 DIAGNOSIS — Z30.013 ENCOUNTER FOR INITIAL PRESCRIPTION OF INJECTABLE CONTRACEPTIVE: Primary | ICD-10-CM

## 2018-03-09 PROCEDURE — 99212 OFFICE O/P EST SF 10 MIN: CPT | Mod: 25,S$GLB,, | Performed by: OBSTETRICS & GYNECOLOGY

## 2018-03-09 PROCEDURE — 99999 PR PBB SHADOW E&M-EST. PATIENT-LVL III: CPT | Mod: PBBFAC,,, | Performed by: OBSTETRICS & GYNECOLOGY

## 2018-03-09 PROCEDURE — 96372 THER/PROPH/DIAG INJ SC/IM: CPT | Mod: S$GLB,,, | Performed by: OBSTETRICS & GYNECOLOGY

## 2018-03-09 RX ORDER — VIGABATRIN 500 MG/1
3 TABLET, FILM COATED ORAL 2 TIMES DAILY
COMMUNITY
Start: 2018-02-28 | End: 2019-04-01 | Stop reason: SDUPTHER

## 2018-03-09 RX ADMIN — MEDROXYPROGESTERONE ACETATE 150 MG: 150 INJECTION, SUSPENSION INTRAMUSCULAR at 09:03

## 2018-03-09 NOTE — PROGRESS NOTES
Chief complaint: Here for depot    Deanna Miles is 27 y.o. female  comes in for depot provera IM. Last injection was 1 1/2 year ago. Per mother, patient was previously on Depo but due to concerns of weight gain she stopped the shots. Mother states that the patient has heavy menstrual bleeding requiring her to miss days from school, therefore she would like to restart the depo. Unable to obtain UPT due to patient's developmental delay, LMP was on 18. Patient has no complaints.    Vitals:    18 0905   BP: 102/74       Procedure Note  1 milliliter(s) of depo-provera was injected IM to the hip. Patient tolerated procedure well.  Will return to clinic in 3 months for injection.  Opportunity was taken to discuss safe sex precautions, side effects of medications to watch for, and encouraged to take multivitamins daily.    Assessment  Depot provera injection    Plan  counseled on STD prevention, HIV risk factors and prevention and use and side effects of Depo  return 3 months   The use of the depo contraceptive has been fully discussed with the patient. This includes  the need for regular compliance to ensure adequate contraceptive effect, the physiology which make the shot effective, the instructions for what to do in event of a missed shot and warnings about anticipated minor side effects such as breakthrough spotting, nausea, breast tenderness, weight changes, acne, headaches, etc.  She has been told of the more serious potential side effects such as MI, stroke, and deep vein thrombosis, all of which are very unlikely.  She has been asked to report any signs of such serious problems immediately.  She should back up the shotwith a condom during any cycle in which antibiotics are prescribed, and during the first cycle as well. The need for additional protection, such as a condom, to prevent exposure to sexually transmitted diseases has also been discussed- the patient has been clearly reminded that  Depo cannot protect her against diseases such as HIV and others. She understands and wishes to take the medication as prescribed.

## 2018-03-27 RX ORDER — MEDROXYPROGESTERONE ACETATE 150 MG/ML
150 INJECTION, SUSPENSION INTRAMUSCULAR ONCE
Status: COMPLETED | OUTPATIENT
Start: 2018-03-09 | End: 2018-03-09

## 2018-03-27 NOTE — PROGRESS NOTES
.3/9/2018 @ 0920 Pt administered Medroxyprogesterone 150 mg IM to Left upper outer glut. Pt tolerated well, and instructed next injection due on  5/31/2017. Pt verbalizes understanding.     Lot#[id number]  Exp Date:5/2019  Man: Sanjay

## 2018-03-30 RX ORDER — LACOSAMIDE 200 MG/1
TABLET, FILM COATED ORAL
Qty: 30 TABLET | OUTPATIENT
Start: 2018-03-30

## 2018-04-03 ENCOUNTER — TELEPHONE (OUTPATIENT)
Dept: NEUROLOGY | Facility: CLINIC | Age: 28
End: 2018-04-03

## 2018-04-03 NOTE — TELEPHONE ENCOUNTER
----- Message from Holly Scott sent at 4/3/2018 11:16 AM CDT -----  Contact: tony    [facility]    [phone]  Calling for the status of pts refill request for VIMPAT 200 mg Tab.  Pls call.

## 2018-05-03 ENCOUNTER — OFFICE VISIT (OUTPATIENT)
Dept: FAMILY MEDICINE | Facility: CLINIC | Age: 28
End: 2018-05-03
Payer: MEDICARE

## 2018-05-03 VITALS
TEMPERATURE: 99 F | WEIGHT: 200.81 LBS | BODY MASS INDEX: 35.58 KG/M2 | DIASTOLIC BLOOD PRESSURE: 60 MMHG | SYSTOLIC BLOOD PRESSURE: 100 MMHG | HEART RATE: 121 BPM | HEIGHT: 63 IN | OXYGEN SATURATION: 96 %

## 2018-05-03 DIAGNOSIS — G40.909 SEIZURE DISORDER: Primary | ICD-10-CM

## 2018-05-03 PROCEDURE — 99499 UNLISTED E&M SERVICE: CPT | Mod: S$GLB,,, | Performed by: NURSE PRACTITIONER

## 2018-05-03 PROCEDURE — 99214 OFFICE O/P EST MOD 30 MIN: CPT | Mod: S$GLB,,, | Performed by: NURSE PRACTITIONER

## 2018-05-03 RX ORDER — CLOBAZAM 20 MG/1
20 TABLET ORAL 2 TIMES DAILY
Qty: 60 TABLET | Refills: 5 | Status: SHIPPED | OUTPATIENT
Start: 2018-05-03 | End: 2018-10-30

## 2018-05-03 RX ORDER — LACOSAMIDE 200 MG/1
200 TABLET ORAL DAILY
Qty: 30 TABLET | Refills: 5 | Status: SHIPPED | OUTPATIENT
Start: 2018-05-03 | End: 2018-12-27 | Stop reason: SDUPTHER

## 2018-06-07 ENCOUNTER — OFFICE VISIT (OUTPATIENT)
Dept: OBSTETRICS AND GYNECOLOGY | Facility: CLINIC | Age: 28
End: 2018-06-07
Payer: MEDICARE

## 2018-06-07 VITALS
DIASTOLIC BLOOD PRESSURE: 74 MMHG | HEIGHT: 63 IN | BODY MASS INDEX: 36.01 KG/M2 | WEIGHT: 203.25 LBS | SYSTOLIC BLOOD PRESSURE: 110 MMHG

## 2018-06-07 DIAGNOSIS — Z30.9 ENCOUNTER FOR CONTRACEPTIVE MANAGEMENT, UNSPECIFIED TYPE: Primary | ICD-10-CM

## 2018-06-07 PROCEDURE — 99212 OFFICE O/P EST SF 10 MIN: CPT | Mod: S$GLB,,, | Performed by: OBSTETRICS & GYNECOLOGY

## 2018-06-07 PROCEDURE — 99999 PR PBB SHADOW E&M-EST. PATIENT-LVL II: CPT | Mod: PBBFAC,,, | Performed by: OBSTETRICS & GYNECOLOGY

## 2018-06-07 PROCEDURE — 3008F BODY MASS INDEX DOCD: CPT | Mod: CPTII,S$GLB,, | Performed by: OBSTETRICS & GYNECOLOGY

## 2018-06-07 RX ORDER — MEDROXYPROGESTERONE ACETATE 150 MG/ML
150 INJECTION, SUSPENSION INTRAMUSCULAR
Qty: 1 ML | Refills: 3 | Status: SHIPPED | OUTPATIENT
Start: 2018-06-07 | End: 2019-06-18 | Stop reason: SDUPTHER

## 2018-06-07 NOTE — PROGRESS NOTES
Here for hayder  Doing well  rx sent to pharmacy next door  Pt will go next door to get shot  Refills sent in, ok to go straight there for shot q 3 months

## 2018-06-08 ENCOUNTER — CLINICAL SUPPORT (OUTPATIENT)
Dept: OBSTETRICS AND GYNECOLOGY | Facility: CLINIC | Age: 28
End: 2018-06-08
Payer: MEDICARE

## 2018-06-08 DIAGNOSIS — Z30.42 ENCOUNTER FOR DEPO-PROVERA CONTRACEPTION: Primary | ICD-10-CM

## 2018-06-08 PROCEDURE — 96372 THER/PROPH/DIAG INJ SC/IM: CPT | Mod: S$GLB,,, | Performed by: OBSTETRICS & GYNECOLOGY

## 2018-06-08 PROCEDURE — 99999 PR PBB SHADOW E&M-EST. PATIENT-LVL II: CPT | Mod: PBBFAC,,,

## 2018-06-08 RX ORDER — MEDROXYPROGESTERONE ACETATE 150 MG/ML
150 INJECTION, SUSPENSION INTRAMUSCULAR
Status: DISCONTINUED | OUTPATIENT
Start: 2018-06-08 | End: 2023-11-10

## 2018-06-08 RX ADMIN — MEDROXYPROGESTERONE ACETATE 150 MG: 150 INJECTION, SUSPENSION INTRAMUSCULAR at 09:06

## 2018-06-08 NOTE — PROGRESS NOTES
Administered Depo Provera 150 mg/ml @ 0937 06/08/2018  1 ml IM inj in R upper quad gluteus  Patient Tolerated well.  Patient instructed to return on 08/30/18 for next injection.   Patient verbalized understanding.   Lot: [id number]  Exp: 03/2020  Reviewed patient pain scale, allergies, medications, preffered pharmacy, fall risk, and advance directives verbally prior to injection.     Date of last pap/visit:No result found  Last Depo-Provera: 03/09/2018  UPT indicated?NO  Ordering Provider: Dr. Haines

## 2018-10-22 ENCOUNTER — TELEPHONE (OUTPATIENT)
Dept: OBSTETRICS AND GYNECOLOGY | Facility: CLINIC | Age: 28
End: 2018-10-22

## 2018-10-22 NOTE — TELEPHONE ENCOUNTER
Returned call patient was advised to go to [facility] for injection. Patient verbalized understanding.

## 2018-10-22 NOTE — TELEPHONE ENCOUNTER
----- Message from Dane Narayanan sent at 10/22/2018  2:03 PM CDT -----  Contact: Viv Page/mother/[phone]  Schedule depo at [place].

## 2018-10-23 RX ORDER — VIGABATRIN 500 MG/1
TABLET, FILM COATED ORAL
Qty: 180 TABLET | Refills: 10 | OUTPATIENT
Start: 2018-10-23

## 2018-11-02 ENCOUNTER — OFFICE VISIT (OUTPATIENT)
Dept: FAMILY MEDICINE | Facility: CLINIC | Age: 28
End: 2018-11-02
Payer: MEDICARE

## 2018-11-02 VITALS
TEMPERATURE: 98 F | DIASTOLIC BLOOD PRESSURE: 74 MMHG | HEIGHT: 64 IN | WEIGHT: 204.38 LBS | BODY MASS INDEX: 34.89 KG/M2 | OXYGEN SATURATION: 97 % | HEART RATE: 99 BPM | SYSTOLIC BLOOD PRESSURE: 100 MMHG

## 2018-11-02 DIAGNOSIS — Z00.00 ROUTINE HEALTH MAINTENANCE: Primary | ICD-10-CM

## 2018-11-02 DIAGNOSIS — N39.0 URINARY TRACT INFECTION WITHOUT HEMATURIA, SITE UNSPECIFIED: ICD-10-CM

## 2018-11-02 DIAGNOSIS — G40.909 SEIZURE DISORDER: ICD-10-CM

## 2018-11-02 DIAGNOSIS — R63.5 WEIGHT GAIN: ICD-10-CM

## 2018-11-02 DIAGNOSIS — Z51.81 THERAPEUTIC DRUG MONITORING: ICD-10-CM

## 2018-11-02 LAB
BILIRUB SERPL-MCNC: ABNORMAL MG/DL
BLOOD URINE, POC: ABNORMAL
COLOR, POC UA: YELLOW
GLUCOSE UR QL STRIP: ABNORMAL
KETONES UR QL STRIP: ABNORMAL
LEUKOCYTE ESTERASE URINE, POC: ABNORMAL
NITRITE, POC UA: POSITIVE
PH, POC UA: 5
PROTEIN, POC: ABNORMAL
SPECIFIC GRAVITY, POC UA: 1.01
UROBILINOGEN, POC UA: ABNORMAL

## 2018-11-02 PROCEDURE — 99213 OFFICE O/P EST LOW 20 MIN: CPT | Mod: 25,S$GLB,, | Performed by: FAMILY MEDICINE

## 2018-11-02 PROCEDURE — 81002 URINALYSIS NONAUTO W/O SCOPE: CPT | Mod: S$GLB,,, | Performed by: FAMILY MEDICINE

## 2018-11-02 RX ORDER — CIPROFLOXACIN 250 MG/1
250 TABLET, FILM COATED ORAL 2 TIMES DAILY
Qty: 10 TABLET | Refills: 0 | Status: SHIPPED | OUTPATIENT
Start: 2018-11-02 | End: 2019-07-23

## 2018-11-02 NOTE — PROGRESS NOTES
Patient ID: Deanna Miles is a 28 y.o. female.    Chief Complaint: 90L form and Urinary Tract Infection    HPI       Deanna Miles is a 28 y.o. female with autism seizure disorder here for yearly visit and fill out 90 L    Care taker describes urine as cloudy and that she goes to the bathroom frequently.      Review of Symptoms    Constitutional  Neg activity change, No chills/ fever   Resp  Neg hemoptysis, stridor, choking  [facility]  Neg chest pain, palpitations  Patient cannot provide review of systems caretaker describes no new problems      Physical Exam    Vitals:    11/02/18 1036   BP: 100/74   Pulse:    Temp:        Constitutional:   Patient is alert response to physical cues non verbal.   No distress.     HENT:   Head: Normocephalic and atraumatic.     Right Ear: Tympanic membrane, external ear and ear canal normal     Left Ear: Tympanic membrane, external ear and ear canal normal    Nose: External Normal. Normal turbinates, No rhinorrhea     Mouth/Throat: Uvula is midline, oropharynx is clear and moist and mucous membranes are normal.     Neck: supple no anterior cervical adenopathy      Eyes:   Conjunctivae are normal. Right eye exhibits no discharge. Left eye exhibits no discharge. No scleral icterus. No periorbital edema       Cardiovascular:  Regular rate and rhythm with normal S1 and S2     Pulmonary/Chest:   Clear to auscultation bilaterally without wheezes, rhonchi or rales      Musculoskeletal:  No edema.  No wasting     Neurological:   Alert and oriented to person, place, and time. Coordination normal.     Skin:   Skin is warm and dry.   No diaphoresis.   No rash noted.    Psychiatric: Normal mood and affect. Behavior is normal. Judgment and thought content normal.       Assessment / Plan:      ICD-10-CM ICD-9-CM   1. Routine health maintenance Z00.00 V70.0   2. Seizure disorder G40.909 345.90   3. Weight gain R63.5 783.1   4. Therapeutic drug monitoring Z51.81 V58.83   5. Urinary tract infection  without hematuria, site unspecified N39.0 599.0     Routine health maintenance    Seizure disorder    Weight gain    Therapeutic drug monitoring    Urinary tract infection without hematuria, site unspecified  -     Urinalysis; Future; Expected date: 11/02/2018  -     POCT urine dipstick without microscope    Other orders  -     ciprofloxacin HCl (CIPRO) 250 MG tablet; Take 1 tablet (250 mg total) by mouth 2 (two) times daily.  Dispense: 10 tablet; Refill: 0

## 2018-11-06 ENCOUNTER — TELEPHONE (OUTPATIENT)
Dept: NEUROLOGY | Facility: CLINIC | Age: 28
End: 2018-11-06

## 2018-12-06 RX ORDER — CLOBAZAM 20 MG/1
20 TABLET ORAL 2 TIMES DAILY
Qty: 60 TABLET | Refills: 5 | Status: SHIPPED | OUTPATIENT
Start: 2018-12-06 | End: 2019-05-14 | Stop reason: SDUPTHER

## 2018-12-27 RX ORDER — LACOSAMIDE 200 MG/1
200 TABLET ORAL DAILY
Qty: 30 TABLET | Refills: 5 | Status: SHIPPED | OUTPATIENT
Start: 2018-12-27 | End: 2019-09-09 | Stop reason: SDUPTHER

## 2019-03-18 ENCOUNTER — TELEPHONE (OUTPATIENT)
Dept: OBSTETRICS AND GYNECOLOGY | Facility: CLINIC | Age: 29
End: 2019-03-18

## 2019-03-18 NOTE — TELEPHONE ENCOUNTER
----- Message from Inez Ruiz sent at 3/18/2019 11:06 AM CDT -----  Contact: pharmacist Adrienne briggs/ [facility] [phone]  Patient is there to receive a depo injection and she needs authorization because the Rx was written June 2018. Patient is waiting. Please advise.

## 2019-03-20 ENCOUNTER — PATIENT MESSAGE (OUTPATIENT)
Dept: OBSTETRICS AND GYNECOLOGY | Facility: CLINIC | Age: 29
End: 2019-03-20

## 2019-03-20 ENCOUNTER — TELEPHONE (OUTPATIENT)
Dept: OBSTETRICS AND GYNECOLOGY | Facility: CLINIC | Age: 29
End: 2019-03-20

## 2019-03-20 DIAGNOSIS — Z30.9 ENCOUNTER FOR CONTRACEPTIVE MANAGEMENT, UNSPECIFIED TYPE: Primary | ICD-10-CM

## 2019-03-20 NOTE — TELEPHONE ENCOUNTER
----- Message from Nia Holt sent at 3/20/2019 10:41 AM CDT -----  Contact: [phone]/ pts talha Nam  Called in requesting UPT orders be placed so pt can have performed today at [facility] and get depo injection at local pharmacy. Please advise.

## 2019-03-20 NOTE — PROGRESS NOTES
Administered medroxyprogesterone 150 mg/ml on 3/20/19 at 2:35 pm in the right hip. UPT negative per lab on 3/20/19.  Lot#: [id number]  EXP: 4/30/2023  Patient should receive next injection between 6/5/2019-6/19/2019 in the left hip.

## 2019-04-01 ENCOUNTER — OFFICE VISIT (OUTPATIENT)
Dept: FAMILY MEDICINE | Facility: CLINIC | Age: 29
End: 2019-04-01
Payer: MEDICARE

## 2019-04-01 VITALS
OXYGEN SATURATION: 97 % | SYSTOLIC BLOOD PRESSURE: 100 MMHG | DIASTOLIC BLOOD PRESSURE: 80 MMHG | HEIGHT: 64 IN | BODY MASS INDEX: 35.38 KG/M2 | HEART RATE: 110 BPM | WEIGHT: 207.25 LBS

## 2019-04-01 DIAGNOSIS — R68.89 FLU-LIKE SYMPTOMS: ICD-10-CM

## 2019-04-01 DIAGNOSIS — J30.9 ALLERGIC RHINITIS WITH POSTNASAL DRIP: Primary | ICD-10-CM

## 2019-04-01 DIAGNOSIS — R09.82 ALLERGIC RHINITIS WITH POSTNASAL DRIP: Primary | ICD-10-CM

## 2019-04-01 DIAGNOSIS — G40.909 SEIZURE DISORDER: ICD-10-CM

## 2019-04-01 PROCEDURE — 99499 UNLISTED E&M SERVICE: CPT | Mod: S$GLB,,, | Performed by: NURSE PRACTITIONER

## 2019-04-01 PROCEDURE — 99213 PR OFFICE/OUTPT VISIT, EST, LEVL III, 20-29 MIN: ICD-10-PCS | Mod: S$GLB,,, | Performed by: NURSE PRACTITIONER

## 2019-04-01 PROCEDURE — 99213 OFFICE O/P EST LOW 20 MIN: CPT | Mod: S$GLB,,, | Performed by: NURSE PRACTITIONER

## 2019-04-01 PROCEDURE — 3008F BODY MASS INDEX DOCD: CPT | Mod: CPTII,S$GLB,, | Performed by: NURSE PRACTITIONER

## 2019-04-01 PROCEDURE — 99499 RISK ADDL DX/OHS AUDIT: ICD-10-PCS | Mod: S$GLB,,, | Performed by: NURSE PRACTITIONER

## 2019-04-01 PROCEDURE — 3008F PR BODY MASS INDEX (BMI) DOCUMENTED: ICD-10-PCS | Mod: CPTII,S$GLB,, | Performed by: NURSE PRACTITIONER

## 2019-04-01 RX ORDER — FLUTICASONE PROPIONATE 50 MCG
2 SPRAY, SUSPENSION (ML) NASAL DAILY
Qty: 16 G | Refills: 0 | Status: SHIPPED | OUTPATIENT
Start: 2019-04-01 | End: 2019-04-28 | Stop reason: SDUPTHER

## 2019-04-01 RX ORDER — VIGABATRIN 500 MG/1
1500 TABLET, FILM COATED ORAL 2 TIMES DAILY
Qty: 180 TABLET | Refills: 5 | Status: SHIPPED | OUTPATIENT
Start: 2019-04-01 | End: 2019-05-01 | Stop reason: SDUPTHER

## 2019-04-01 RX ORDER — CETIRIZINE HYDROCHLORIDE 10 MG/1
10 TABLET ORAL DAILY
Qty: 30 TABLET | Refills: 0 | Status: SHIPPED | OUTPATIENT
Start: 2019-04-01 | End: 2019-04-28 | Stop reason: SDUPTHER

## 2019-04-01 NOTE — PROGRESS NOTES
Subjective:       Patient ID: Deanna Miles is a 28 y.o. female.    Chief Complaint: Cough and Medication Refill    29 y/o female with history of autism and seizure disorder presents to clinic with mother for urgent care visit with c/o cough. Patient's mother states cough started 2 weeks ago. The patient has been taking [facility] Plus and mucinex without much improvement.    Patient's mother request refill for Sabril. Reports no seizure in > 6 months.  Cough   This is a new problem. The current episode started 1 to 4 weeks ago. The problem has been gradually worsening. The cough is non-productive. Associated symptoms include rhinorrhea. Pertinent negatives include no chest pain, chills, ear congestion, fever, headaches, hemoptysis, myalgias, shortness of breath or wheezing. The symptoms are aggravated by lying down. She has tried OTC cough suppressant for the symptoms. The treatment provided mild relief. There is no history of asthma or environmental allergies.       Current Outpatient Medications   Medication Sig Dispense Refill    lacosamide (VIMPAT) 200 mg Tab tablet Take 1 tablet (200 mg total) by mouth once daily. 30 tablet 5    medroxyPROGESTERone (DEPO-PROVERA) 150 mg/mL Syrg Inject 1 mL (150 mg total) into the muscle every 3 (three) months. 1 mL 3    ONFI 20 mg Tab Take 1 tablet (20 mg total) by mouth 2 (two) times daily. 60 tablet 5    SABRIL tablet Take 3 tablets (1,500 mg total) by mouth 2 (two) times daily. 180 tablet 5    cetirizine (ZYRTEC) 10 MG tablet Take 1 tablet (10 mg total) by mouth once daily. 30 tablet 0    ciprofloxacin HCl (CIPRO) 250 MG tablet Take 1 tablet (250 mg total) by mouth 2 (two) times daily. 10 tablet 0    fluticasone (FLONASE) 50 mcg/actuation nasal spray 2 sprays (100 mcg total) by Each Nare route once daily. 16 g 0     Current Facility-Administered Medications   Medication Dose Route Frequency Provider Last Rate Last Dose    medroxyPROGESTERone (DEPO-PROVERA)  injection 150 mg  150 mg Intramuscular Q90 Days Jesus Haines MD   150 mg at 06/08/18 0937       Past Medical History:   Diagnosis Date    Autism disorder     Encounter for blood transfusion     Seizures     Strabismus        Past Surgical History:   Procedure Laterality Date    EGD (ESOPHAGOGASTRODUODENOSCOPY) N/A 12/16/2013    Performed by Saud Tristan MD at [facility] ([address] [place] [address])    EYE SURGERY      STRABISMUS SURGERY         Family History   Problem Relation Age of Onset    Diabetes Father     Hypertension Father     Cataracts Maternal Aunt     Diabetes Paternal Aunt     Diabetes Paternal Uncle     Diabetes Paternal Grandmother     Hypertension Paternal Grandmother     No Known Problems Mother     No Known Problems Sister     No Known Problems Brother     No Known Problems Maternal Uncle     No Known Problems Maternal Grandmother     No Known Problems Maternal Grandfather     No Known Problems Paternal Grandfather     Amblyopia Neg Hx     Blindness Neg Hx     Cancer Neg Hx     Glaucoma Neg Hx     Macular degeneration Neg Hx     Retinal detachment Neg Hx     Strabismus Neg Hx     Stroke Neg Hx     Thyroid disease Neg Hx        Social History     Socioeconomic History    Marital status: Single     Spouse name: None    Number of children: None    Years of education: None    Highest education level: None   Occupational History    None   Social Needs    Financial resource strain: None    Food insecurity:     Worry: None     Inability: None    Transportation needs:     Medical: None     Non-medical: None   Tobacco Use    Smoking status: Never Smoker   Substance and Sexual Activity    Alcohol use: No    Drug use: No    Sexual activity: Never   Lifestyle    Physical activity:     Days per week: None     Minutes per session: None    Stress: None   Relationships    Social connections:     Talks on phone: None     Gets together: None     Attends Zoro[employer]rian service: None     " Active member of club or organization: None     Attends meetings of clubs or organizations: None     Relationship status: None    Intimate partner violence:     Fear of current or ex partner: None     Emotionally abused: None     Physically abused: None     Forced sexual activity: None   Other Topics Concern    None   Social History Narrative    22 yo  Female developmental delayed    Non-verbal            Review of Systems   Constitutional: Negative for chills and fever.   HENT: Positive for rhinorrhea.    Respiratory: Positive for cough. Negative for hemoptysis, shortness of breath and wheezing.    Cardiovascular: Negative for chest pain.   Musculoskeletal: Negative for myalgias.   Allergic/Immunologic: Negative for environmental allergies.   Neurological: Negative for headaches.         Objective:     Vitals:    04/01/19 1131   BP: 100/80   Pulse: 110   SpO2: 97%   Weight: 94 kg (207 lb 3.7 oz)   Height: 5' 4" (1.626 m)          Physical Exam   Constitutional: She appears well-developed and well-nourished. No distress.   HENT:   Head: Normocephalic.   Right Ear: Tympanic membrane and ear canal normal.   Left Ear: Tympanic membrane and ear canal normal.   Nose: Mucosal edema and rhinorrhea present.   Mouth/Throat: Posterior oropharyngeal erythema (+pnd) present. No oropharyngeal exudate or posterior oropharyngeal edema.   Eyes: Pupils are equal, round, and reactive to light. Conjunctivae are normal.   Neck: Normal range of motion. Neck supple.   Cardiovascular: Normal rate and regular rhythm.   Pulmonary/Chest: Effort normal and breath sounds normal.   Musculoskeletal: Normal range of motion.   Neurological: She is alert.   Does not follow commands.   Skin: Skin is warm and dry.   Psychiatric: She has a normal mood and affect.         Assessment:         ICD-10-CM ICD-9-CM   1. Allergic rhinitis with postnasal drip J30.9 477.9    R09.82 784.91   2. Flu-like symptoms R68.89 780.99   3. Seizure disorder G40.909 " 345.90       Plan:       Allergic rhinitis with postnasal drip  -     cetirizine (ZYRTEC) 10 MG tablet; Take 1 tablet (10 mg total) by mouth once daily.  Dispense: 30 tablet; Refill: 0  -     fluticasone (FLONASE) 50 mcg/actuation nasal spray; 2 sprays (100 mcg total) by Each Nare route once daily.  Dispense: 16 g; Refill: 0    Flu-like symptoms  -     POCT Influenza A/B    Seizure disorder  -     SABRIL tablet; Take 3 tablets (1,500 mg total) by mouth 2 (two) times daily.  Dispense: 180 tablet; Refill: 5    Rapid flu test negative. Instructed mother to encourage fluid intake. Recommend warm fluids, tea & soup    Follow up if symptoms worsen or fail to improve.     Patient's Medications   New Prescriptions    CETIRIZINE (ZYRTEC) 10 MG TABLET    Take 1 tablet (10 mg total) by mouth once daily.    FLUTICASONE (FLONASE) 50 MCG/ACTUATION NASAL SPRAY    2 sprays (100 mcg total) by Each Nare route once daily.   Previous Medications    CIPROFLOXACIN HCL (CIPRO) 250 MG TABLET    Take 1 tablet (250 mg total) by mouth 2 (two) times daily.    LACOSAMIDE (VIMPAT) 200 MG TAB TABLET    Take 1 tablet (200 mg total) by mouth once daily.    MEDROXYPROGESTERONE (DEPO-PROVERA) 150 MG/ML SYRG    Inject 1 mL (150 mg total) into the muscle every 3 (three) months.    ONFI 20 MG TAB    Take 1 tablet (20 mg total) by mouth 2 (two) times daily.   Modified Medications    Modified Medication Previous Medication    SABRIL TABLET SABRIL tablet       Take 3 tablets (1,500 mg total) by mouth 2 (two) times daily.    Take 3 tablets by mouth 2 (two) times daily.   Discontinued Medications    No medications on file

## 2019-04-01 NOTE — PATIENT INSTRUCTIONS

## 2019-04-28 DIAGNOSIS — R09.82 ALLERGIC RHINITIS WITH POSTNASAL DRIP: ICD-10-CM

## 2019-04-28 DIAGNOSIS — J30.9 ALLERGIC RHINITIS WITH POSTNASAL DRIP: ICD-10-CM

## 2019-04-29 RX ORDER — CETIRIZINE HYDROCHLORIDE 10 MG/1
TABLET ORAL
Qty: 30 TABLET | Refills: 1 | Status: SHIPPED | OUTPATIENT
Start: 2019-04-29 | End: 2019-06-20 | Stop reason: SDUPTHER

## 2019-04-29 RX ORDER — FLUTICASONE PROPIONATE 50 MCG
SPRAY, SUSPENSION (ML) NASAL
Qty: 16 ML | Refills: 1 | Status: SHIPPED | OUTPATIENT
Start: 2019-04-29 | End: 2021-02-18

## 2019-05-01 DIAGNOSIS — G40.909 SEIZURE DISORDER: ICD-10-CM

## 2019-05-01 RX ORDER — VIGABATRIN 500 MG/1
1500 TABLET, FILM COATED ORAL 2 TIMES DAILY
Qty: 540 TABLET | Refills: 3 | Status: SHIPPED | OUTPATIENT
Start: 2019-05-01 | End: 2020-05-07 | Stop reason: SDUPTHER

## 2019-05-01 NOTE — TELEPHONE ENCOUNTER
----- Message from Wilfred Antonio sent at 5/1/2019 11:13 AM CDT -----  Contact: [phone] Viv   Type:  RX Refill Request    Who Called: Viv - pt mother    Refill or New Rx: Refill    RX Name and Strength: SABRIL tablet    How is the patient currently taking it? (ex. 1XDay): Take 3 tablets (1,500 mg total) by mouth 2 (two) times daily    Is this a 30 day or 90 day RX: 90day supply     Preferred Pharmacy with phone number: [facility]         Local or Mail Order: Mail    Ordering Provider:    Would the patient rather a call back or a response via [facility]? Call back    Best Call Back Number: [phone]    Additional Information: pt mother advised she only have 2 days left of medication

## 2019-05-02 DIAGNOSIS — G40.909 SEIZURE DISORDER: ICD-10-CM

## 2019-05-02 RX ORDER — VIGABATRIN 500 MG/1
TABLET ORAL
Qty: 180 TABLET | Refills: 1 | OUTPATIENT
Start: 2019-05-02

## 2019-05-14 ENCOUNTER — TELEPHONE (OUTPATIENT)
Dept: FAMILY MEDICINE | Facility: CLINIC | Age: 29
End: 2019-05-14

## 2019-05-14 RX ORDER — CLOBAZAM 20 MG/1
20 TABLET ORAL 2 TIMES DAILY
Qty: 60 TABLET | Refills: 5 | Status: SHIPPED | OUTPATIENT
Start: 2019-05-14 | End: 2019-11-26 | Stop reason: SDUPTHER

## 2019-05-14 NOTE — TELEPHONE ENCOUNTER
----- Message from Tootie Duarte sent at 5/14/2019 12:34 PM CDT -----  Contact: [phone] Patient's mother  Patient's mother is requesting the medication ONFI 20 mg Tab to be changed to generic form. Please call

## 2019-05-14 NOTE — TELEPHONE ENCOUNTER
Insurance wont cover ONFI 20 MG ; Please send in something equivalent . [facility] [place]

## 2019-05-14 NOTE — TELEPHONE ENCOUNTER
I called and notified the pt dad as her mom was at work  rx changed to generic as the mother requested

## 2019-05-15 RX ORDER — CLONAZEPAM 0.5 MG/1
TABLET ORAL
Qty: 60 TABLET | Refills: 0 | Status: SHIPPED | OUTPATIENT
Start: 2019-05-15 | End: 2020-05-26

## 2019-05-15 NOTE — TELEPHONE ENCOUNTER
I called and LM for the pt mom to rtn call to notify her that the pt needs to rtn to neurology to obtain a replacement med for ONFI 20 MG ( per dr herron )- dr herron has already tried to send in a replaced med and I am assuming that was not covered either.

## 2019-05-16 NOTE — TELEPHONE ENCOUNTER
James STEELE [facility] Staff   Caller: mother/ [phone] (Today, 12:38 PM)             Patient mother requesting to speak with you about the medications that was called in.   Please call back to assist at [phone]

## 2019-05-16 NOTE — TELEPHONE ENCOUNTER
MONSE    Spoke with patient's mother he states she has received the prescription for ONFI. No further action needed insurance covered the medication.

## 2019-05-17 NOTE — TELEPHONE ENCOUNTER
What up with all that-?    Please call and discontinue Klonopin that was ordered as a substitute    She should see the neurologist follow-up

## 2019-05-17 NOTE — TELEPHONE ENCOUNTER
NO the Klonopin or clonazepam and was only because the last message was they needed a substitution for her onif bc insurance would not pay for it

## 2019-05-17 NOTE — TELEPHONE ENCOUNTER
Left message advising patient's mom not to give patient the clonazepam; I advised that it was only ordered to take the place of ONFI due to insurance originally stating they wouldn't cover it. Mother advised to contact office to confirm she received this message

## 2019-05-20 NOTE — TELEPHONE ENCOUNTER
Left second message advising mother not to give patient clonazepam; medication only sent in to replace ONFI due to insurance coverage issues.

## 2019-06-14 ENCOUNTER — TELEPHONE (OUTPATIENT)
Dept: FAMILY MEDICINE | Facility: CLINIC | Age: 29
End: 2019-06-14

## 2019-06-14 ENCOUNTER — TELEPHONE (OUTPATIENT)
Dept: NEUROLOGY | Facility: CLINIC | Age: 29
End: 2019-06-14

## 2019-06-14 DIAGNOSIS — G40.901 STATUS EPILEPTICUS: ICD-10-CM

## 2019-06-14 RX ORDER — VIGABATRIN 500 MG/1
1500 POWDER, FOR SOLUTION ORAL 2 TIMES DAILY
Qty: 180 EACH | Refills: 11 | Status: CANCELLED | OUTPATIENT
Start: 2019-06-14 | End: 2020-09-18

## 2019-06-14 NOTE — TELEPHONE ENCOUNTER
----- Message from Chichi Vasquez sent at 6/14/2019 11:21 AM CDT -----  Contact: [facility], [phone] option 3  Called in requesting Vigabatrin 500 mg prescription order. Reference# [id number].

## 2019-06-14 NOTE — TELEPHONE ENCOUNTER
I spoke with Amy, in Neurology  She did authorize the medication for a month  Dr Macario can not refill this medication bc it is a specialty med.  Amy BRICE for the pt mom advising her to schedule appt in the near future   To follow up with neurology

## 2019-06-14 NOTE — TELEPHONE ENCOUNTER
sabril shipment pharmacy [facility] stated pt family said they changed doctors and Dr. Dalton was to prescribe her sabril.     I attempted to contact pt/family, but only able to leave message at both numbers.            ----- Message from Mary Beth Rogers MA sent at 6/14/2019 12:43 PM CDT -----  [facility] @ [phone] option 3 ref# [id number]      ----- Message -----  From: La Begum  Sent: 6/14/2019  12:15 PM  To: Yuridia GARCIA Staff    Calling to speak with someone regarding the refill of patient's medication: SABRIL tablet, says patient is requesting brand name (needing a new prescription). Please call.    [facility]  [address] [place] [address]  Phone: [phone] Fax: [phone]

## 2019-06-14 NOTE — TELEPHONE ENCOUNTER
Okay to refill current medication if prior this is a shin are any other complication-needs to see neurologist

## 2019-06-14 NOTE — TELEPHONE ENCOUNTER
I returned pt's mother's call. I informed her that we called in the sabri under Dr. Shi for one month. We will call them when schedule opens for July appt.      ----- Message from Mary Beth Rogers MA sent at 6/14/2019  2:37 PM CDT -----  Contact: Patient @ [phone]      ----- Message -----  From: Filipe Kelley  Sent: 6/14/2019   2:27 PM  To: [facility]ARCIA Staff    Patient returning a missed call from martha Reyes return call

## 2019-06-17 ENCOUNTER — TELEPHONE (OUTPATIENT)
Dept: NEUROLOGY | Facility: CLINIC | Age: 29
End: 2019-06-17

## 2019-06-18 RX ORDER — MEDROXYPROGESTERONE ACETATE 150 MG/ML
150 INJECTION, SUSPENSION INTRAMUSCULAR
Qty: 1 ML | Refills: 3 | Status: SHIPPED | OUTPATIENT
Start: 2019-06-18 | End: 2021-02-18

## 2019-06-18 RX ORDER — MEDROXYPROGESTERONE ACETATE 150 MG/ML
150 INJECTION, SUSPENSION INTRAMUSCULAR ONCE
Qty: 1 ML | Refills: 0 | Status: CANCELLED | OUTPATIENT
Start: 2019-06-18 | End: 2019-06-18

## 2019-06-18 NOTE — PROGRESS NOTES
Administered medroxyprogesterone 150 mg/ml on 6/18/2019 at 3:00 pm in the left hip.  Lot#: [id number]   EXP: 5/31/2023  Patient should receive next injection between 9/3/2019-9/17/2019 in the right hip.

## 2019-06-20 DIAGNOSIS — J30.9 ALLERGIC RHINITIS WITH POSTNASAL DRIP: ICD-10-CM

## 2019-06-20 DIAGNOSIS — R09.82 ALLERGIC RHINITIS WITH POSTNASAL DRIP: ICD-10-CM

## 2019-06-20 RX ORDER — CETIRIZINE HYDROCHLORIDE 10 MG/1
10 TABLET ORAL DAILY
Qty: 30 TABLET | Refills: 1 | Status: SHIPPED | OUTPATIENT
Start: 2019-06-20 | End: 2019-08-18 | Stop reason: SDUPTHER

## 2019-06-21 RX ORDER — CLOBAZAM 20 MG/1
TABLET ORAL
Qty: 60 TABLET | Refills: 2 | Status: SHIPPED | OUTPATIENT
Start: 2019-06-21 | End: 2019-07-23

## 2019-07-23 ENCOUNTER — OFFICE VISIT (OUTPATIENT)
Dept: NEUROLOGY | Facility: CLINIC | Age: 29
End: 2019-07-23
Payer: MEDICARE

## 2019-07-23 VITALS
HEART RATE: 91 BPM | SYSTOLIC BLOOD PRESSURE: 102 MMHG | WEIGHT: 207.44 LBS | DIASTOLIC BLOOD PRESSURE: 74 MMHG | BODY MASS INDEX: 35.41 KG/M2 | HEIGHT: 64 IN

## 2019-07-23 DIAGNOSIS — G40.909 SEIZURE DISORDER: Primary | ICD-10-CM

## 2019-07-23 PROCEDURE — 99999 PR PBB SHADOW E&M-EST. PATIENT-LVL III: CPT | Mod: PBBFAC,,, | Performed by: PSYCHIATRY & NEUROLOGY

## 2019-07-23 PROCEDURE — 99213 OFFICE O/P EST LOW 20 MIN: CPT | Mod: S$GLB,,, | Performed by: PSYCHIATRY & NEUROLOGY

## 2019-07-23 PROCEDURE — 3008F BODY MASS INDEX DOCD: CPT | Mod: CPTII,S$GLB,, | Performed by: PSYCHIATRY & NEUROLOGY

## 2019-07-23 PROCEDURE — 99213 PR OFFICE/OUTPT VISIT, EST, LEVL III, 20-29 MIN: ICD-10-PCS | Mod: S$GLB,,, | Performed by: PSYCHIATRY & NEUROLOGY

## 2019-07-23 PROCEDURE — 3008F PR BODY MASS INDEX (BMI) DOCUMENTED: ICD-10-PCS | Mod: CPTII,S$GLB,, | Performed by: PSYCHIATRY & NEUROLOGY

## 2019-07-23 PROCEDURE — 99999 PR PBB SHADOW E&M-EST. PATIENT-LVL III: ICD-10-PCS | Mod: PBBFAC,,, | Performed by: PSYCHIATRY & NEUROLOGY

## 2019-07-23 NOTE — PROGRESS NOTES
"INTERVAL HISTORY:  7/23/19:  Was having some brief staring spells earlier this summer at times.  Mother thought was associated with heat  Seem to have stopped in past 2 weeks, she says  No changes to meds  Overall, has been doing very well. No major seizures at all.       9/21/17:   Only occasional small staring spell  No significant sz  Mom worried she may have UTI now  Doing well with sz control so far        3/9/16:  Doing great as far as seizure control.  Has been gaining a lot of weight.  Otherwise no complaints        CURRENT meds:  Sabril 1500mg BID  LCM 200mg BID  Onfi 20mg BID     _______________________________________________________________________________________     Laureen Brown MD at 7/15/2014 11:32 AM   Two ED Visits 5/31/14 and 6/2/14 for recurrent seizure activity.     Currently on:  Sabril 1500mg BID  LCM 200mg BID  LEV [phone]  7/7/14: 18     Pt very lethargic during the daytime.        4/09/14  Mom increased sabril. On LEV and LCM.  No more seizures per mom  Mom complains of insomnia- pt had problem with this before.     Sabril 1500mg BID  LCM 200mg BID  LEV [phone]     EEG showed bifrontal slowing        2/20/14  During [facility] stay, the neurologist weaned her off the onfi and decreased her sabril to 1000mg/day.     She had episodes of shaking, eyes roving, head turn to the left after discharge from rehab. Three days after d/c, she pulled her feeding tube out and had seizures in the [place] ER. She didn't receive any benzos in the ER. Mom put her back on two packets BID (500mg in each packet). Yesterday, she had another seizure for 2 minutes.      Patient with insomnia at night. She takes quick naps during the day.      Seen by ophthalmology- couldn't find note. Normal exam reportedly.      Current AED:  Sabril 1000mg BID  LCM 200mg BID  LEV 1000 mg qid     HISTORY OF PRESENT ILLNESS (HPI):   "The patient is a 23 y.o. yo right handed AA woman with autism and a remote history of " "epilepsy who is being evaluated for NCSE. Consultation was requested by Dr. Olivo.   Ms. Miles developed seizures at 1 month. She was placed on PB and continued to have seizures (febrile?). Last known seizure was at the age of 6. Her adoptive mother says that seizure type consisted of head jerking back and biting on her lip. She was weaned of PB at the age of 6, but did nto have recurrence since that time.   Her mother noticed staring spells that began in sep 2013. Ms. Miles is non-verbal at baseline, but communicates with non-verbal gestures. During these spells, she would stare off without any associated automatisms. These lasted 1 minute and occurred 1x/week.   Yesterday at 6pm, the patient had three seizure clusters at home (generalized likely) and had two more in the hospital. She was given valium and ativan, intubated and transferred to [facility]. Her first seizure began with hand fumbling and decreased interaction, this then progressed to head and extremity jerking.   At the OSH, she had an LP (normal)."     During the hospitalization she was started on onfi, sabril, viimpat and keppra. She also was on ketamine for seizure control.              Allergies    Allergen  Reactions      Milk Containing Products  Diarrhea                 Current Medications                  Current Outpatient Prescriptions    Medication  Sig  Dispense  Refill      lacosamide (VIMPAT) 200 mg Tab  Take 1 tablet (200 mg total) by mouth 2 (two) times daily.  60 tablet  11      levetiracetam (KEPPRA) 1000 MG tablet  Take 1000mg by mouth in the morning   Take 2000mg by mouth in the evening  120 tablet  11      MELATONIN ORAL  Take by mouth.          vigabatrin (SABRIL) 500 mg PwPk  3 each (1,500 mg total) by Per G Tube route 2 (two) times daily.  180 each  11    No current facility-administered medications for this visit.                   Past Medical History    Diagnosis  Date      Autism disorder        Seizures        " Encounter for blood transfusion        Strabismus                   Past Surgical History    Procedure  Laterality  Date      Strabismus surgery                     Family History    Problem  Relation  Age of Onset      Diabetes  Father        Hypertension  Father        Cataracts  Maternal Aunt        Diabetes  Paternal Aunt        Diabetes  Paternal Uncle        Diabetes  Paternal Grandmother        Hypertension  Paternal Grandmother        Amblyopia  Neg Hx        Blindness  Neg Hx        Cancer  Neg Hx        Glaucoma  Neg Hx        Macular degeneration  Neg Hx        Retinal detachment  Neg Hx        Strabismus  Neg Hx        Stroke  Neg Hx        Thyroid disease  Neg Hx                   Social History                        History    Social History      Marital Status:  Single        Spouse Name:  N/A        Number of Children:  N/A      Years of Education:  N/A    Occupational History        Not on file.      Social History Main Topics      Smoking status:  Never Smoker      Smokeless tobacco:  Not on file      Alcohol Use:  No      Drug Use:  No      Sexually Active:  Not on file    Other Topics  Concern          Not on file        Social History Narrative      24 yo Female developmental delayedNon-verbal                     Filed Vitals:      07/15/14 1116    BP:  120/79    Pulse:  79          Higher Cortical Function:   Patient is a well developed, pleasant, well groomed individual appearing their stated age   Awake, alert, smiles  Doesn't follow any commands  Cranial Nerves II - XII:   EOMs with exotropia in left eye  PERRLA. D/C Funduscopic exam - unable to perform,  Blinks to threat  Motor - facial movement was symmetrical and normal.   Motor: Power, bulk and tone were decreased in all extremities.   Sensory: responds to LT  Coordination:   Unable to assess.   Gait: gait is slow an wide-based.  Deep tendon reflexes: 2+ throughtout  Tremor: resting, postural, intentional -  none   Pulses   Carotids - strong without bruits   Peripheral - strong and symmetrical   CV- RRR      3/22/14 EEG routine:  Findings   The patient's background consists of a posteriorly dominant 11 Hz alpha rhythm, which is moderately well formed, moderately well modulated, and abolishes with eye opening. Anteriorly, the patient's background consists of admixed alpha, beta, and theta range frequencies. Episodes lasting up to 5 seconds of 6 Hz relatively high amplitude slowing are appreciated bifrontally.   During the course of the recording, the patient is noted to be awake, and subsequently becomes drowsy. Normal sleep architecture is not appreciated.   Hyperventilation and photic stimulation were not performed.   There are no focal, lateralized, or epileptiform transients appreciated in this record.   EKG demonstrates sinus rhythm.   Interpretation   This is an abnormal EEG due to the presence of episodic bifrontal slowing. 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      Impression  Refractory seizures, unow well controlled on sabril, LCM, Onfi  Sabril needs to be continued to prevent aggressive seizures (only drug that stopped her status)  EEG routine c/w generalized slow  MRI shows left MTS - would not repeat MRI at this time     History of status epilepticus, NCSE     Autism, moderate-severe MR     Plan:  1. Continue vimpat 200mg twice a day  2. Continue Sabril 1500mg twice a day  3. Continue Onfi 20mg BID  4. RTC 6 months  5. If suspicious staring spells reoccur, mom is to call office and we will repeat EEG, either prolonged in-office or ambulatory to try to capture and determine if epileptic in nature

## 2019-08-18 DIAGNOSIS — R09.82 ALLERGIC RHINITIS WITH POSTNASAL DRIP: ICD-10-CM

## 2019-08-18 DIAGNOSIS — J30.9 ALLERGIC RHINITIS WITH POSTNASAL DRIP: ICD-10-CM

## 2019-08-19 RX ORDER — CETIRIZINE HYDROCHLORIDE 10 MG/1
TABLET ORAL
Qty: 30 TABLET | Refills: 1 | Status: SHIPPED | OUTPATIENT
Start: 2019-08-19 | End: 2019-10-14 | Stop reason: SDUPTHER

## 2019-09-06 RX ORDER — LACOSAMIDE 200 MG/1
TABLET, FILM COATED ORAL
Qty: 30 TABLET | OUTPATIENT
Start: 2019-09-06

## 2019-09-06 NOTE — TELEPHONE ENCOUNTER
----- Message from Chichijoi Vasquez sent at 9/6/2019  2:27 PM CDT -----  Contact: Viv, mother, [phone]  Called in requesting status of Vimpat prescription refill, [facility] pharmacy sent in a request yesterday and today.

## 2019-09-09 NOTE — TELEPHONE ENCOUNTER
----- Message from Elvira Gray sent at 9/9/2019  1:20 PM CDT -----  Contact: self [phone]  Patient is calling to get refills on her medication sent to [facility]/pharmacy #[id number] - [place] - [address] AT [facility] [phone] (Phone)  [phone] (Fax)        1. lacosamide (VIMPAT) 200 mg Tab tablet 30 tablet

## 2019-09-10 RX ORDER — LACOSAMIDE 200 MG/1
TABLET, FILM COATED ORAL
Qty: 30 TABLET | Refills: 1 | Status: SHIPPED | OUTPATIENT
Start: 2019-09-10 | End: 2019-11-10 | Stop reason: SDUPTHER

## 2019-10-01 ENCOUNTER — TELEPHONE (OUTPATIENT)
Dept: FAMILY MEDICINE | Facility: CLINIC | Age: 29
End: 2019-10-01

## 2019-10-01 DIAGNOSIS — R39.9 URINARY TRACT INFECTION SYMPTOMS: Primary | ICD-10-CM

## 2019-10-01 NOTE — TELEPHONE ENCOUNTER
----- Message from James Valdes sent at 10/1/2019  2:11 PM CDT -----  Contact: [phone]/mother  Patient mother requesting orders for a Urine culture be put in the system  Please advise

## 2019-10-02 ENCOUNTER — TELEPHONE (OUTPATIENT)
Dept: OBSTETRICS AND GYNECOLOGY | Facility: CLINIC | Age: 29
End: 2019-10-02

## 2019-10-02 DIAGNOSIS — Z30.9 ENCOUNTER FOR CONTRACEPTIVE MANAGEMENT, UNSPECIFIED TYPE: Primary | ICD-10-CM

## 2019-10-14 DIAGNOSIS — J30.9 ALLERGIC RHINITIS WITH POSTNASAL DRIP: ICD-10-CM

## 2019-10-14 DIAGNOSIS — R09.82 ALLERGIC RHINITIS WITH POSTNASAL DRIP: ICD-10-CM

## 2019-10-14 RX ORDER — CETIRIZINE HYDROCHLORIDE 10 MG/1
TABLET ORAL
Qty: 30 TABLET | Refills: 1 | Status: SHIPPED | OUTPATIENT
Start: 2019-10-14 | End: 2019-12-06 | Stop reason: SDUPTHER

## 2019-10-28 ENCOUNTER — PATIENT MESSAGE (OUTPATIENT)
Dept: OBSTETRICS AND GYNECOLOGY | Facility: CLINIC | Age: 29
End: 2019-10-28

## 2019-10-28 ENCOUNTER — TELEPHONE (OUTPATIENT)
Dept: OBSTETRICS AND GYNECOLOGY | Facility: CLINIC | Age: 29
End: 2019-10-28

## 2019-10-28 DIAGNOSIS — Z30.9 ENCOUNTER FOR CONTRACEPTIVE MANAGEMENT, UNSPECIFIED TYPE: Primary | ICD-10-CM

## 2019-11-10 RX ORDER — LACOSAMIDE 200 MG/1
TABLET, FILM COATED ORAL
Qty: 30 TABLET | Refills: 1 | Status: SHIPPED | OUTPATIENT
Start: 2019-11-10 | End: 2020-01-10

## 2019-11-26 RX ORDER — CLOBAZAM 20 MG/1
TABLET ORAL
Qty: 60 TABLET | Refills: 1 | Status: SHIPPED | OUTPATIENT
Start: 2019-11-26 | End: 2020-02-11

## 2019-12-06 DIAGNOSIS — R09.82 ALLERGIC RHINITIS WITH POSTNASAL DRIP: ICD-10-CM

## 2019-12-06 DIAGNOSIS — J30.9 ALLERGIC RHINITIS WITH POSTNASAL DRIP: ICD-10-CM

## 2019-12-06 RX ORDER — CETIRIZINE HYDROCHLORIDE 10 MG/1
TABLET ORAL
Qty: 30 TABLET | Refills: 1 | Status: SHIPPED | OUTPATIENT
Start: 2019-12-06 | End: 2020-02-03

## 2019-12-10 ENCOUNTER — TELEPHONE (OUTPATIENT)
Dept: OBSTETRICS AND GYNECOLOGY | Facility: CLINIC | Age: 29
End: 2019-12-10

## 2019-12-10 DIAGNOSIS — Z30.9 ENCOUNTER FOR CONTRACEPTIVE MANAGEMENT, UNSPECIFIED TYPE: Primary | ICD-10-CM

## 2019-12-11 ENCOUNTER — TELEPHONE (OUTPATIENT)
Dept: FAMILY MEDICINE | Facility: CLINIC | Age: 29
End: 2019-12-11

## 2019-12-11 NOTE — TELEPHONE ENCOUNTER
Mother states patient is constantly drinking water like she's thirsty all the time; but every time she goes to urinate she's squinting up screaming it hurts. Mother states she believes patient has a UTI and needs something sent to the pharmacy. [facility]

## 2019-12-11 NOTE — TELEPHONE ENCOUNTER
Urinalysis shows red blood cells but no definitive sign of infection.  Does show that urine is concentrated-probably slightly dehydrated-increase fluids.  Any symptoms?

## 2019-12-11 NOTE — TELEPHONE ENCOUNTER
----- Message from Josefina Portillo sent at 12/11/2019  3:20 PM CST -----  Contact: cell # [phone] Viv  Patient's mother stopped by office. Would like returned phone with urine results. Also wants to know if Rx has been sent to pharmacy based on urine results      See urine results done yesterday

## 2019-12-12 RX ORDER — CEPHALEXIN 500 MG/1
1000 CAPSULE ORAL EVERY 12 HOURS
Qty: 20 CAPSULE | Refills: 0 | Status: SHIPPED | OUTPATIENT
Start: 2019-12-12 | End: 2019-12-17

## 2019-12-23 NOTE — PROGRESS NOTES
Administered medroxyprogesterone 150 mg/ml on 12/10/2019 at 4:00 pm in the right hip.  Lot#: [id number]  EXP: 12/31/2023  Patient should receive next injection between 02/25/20-03/11/20 in the left hip.

## 2020-01-10 RX ORDER — LACOSAMIDE 200 MG/1
TABLET, FILM COATED ORAL
Qty: 30 TABLET | Refills: 1 | Status: SHIPPED | OUTPATIENT
Start: 2020-01-10 | End: 2020-03-12

## 2020-02-03 DIAGNOSIS — J30.9 ALLERGIC RHINITIS WITH POSTNASAL DRIP: ICD-10-CM

## 2020-02-03 DIAGNOSIS — R09.82 ALLERGIC RHINITIS WITH POSTNASAL DRIP: ICD-10-CM

## 2020-02-03 RX ORDER — CETIRIZINE HYDROCHLORIDE 10 MG/1
TABLET ORAL
Qty: 30 TABLET | Refills: 1 | Status: SHIPPED | OUTPATIENT
Start: 2020-02-03 | End: 2020-02-29

## 2020-02-11 RX ORDER — CLOBAZAM 20 MG/1
TABLET ORAL
Qty: 60 TABLET | Refills: 1 | Status: SHIPPED | OUTPATIENT
Start: 2020-02-11 | End: 2020-04-25

## 2020-02-29 DIAGNOSIS — R09.82 ALLERGIC RHINITIS WITH POSTNASAL DRIP: ICD-10-CM

## 2020-02-29 DIAGNOSIS — J30.9 ALLERGIC RHINITIS WITH POSTNASAL DRIP: ICD-10-CM

## 2020-02-29 RX ORDER — CETIRIZINE HYDROCHLORIDE 10 MG/1
TABLET ORAL
Qty: 30 TABLET | Refills: 1 | Status: SHIPPED | OUTPATIENT
Start: 2020-02-29 | End: 2020-03-24

## 2020-03-12 RX ORDER — LACOSAMIDE 200 MG/1
TABLET, FILM COATED ORAL
Qty: 30 TABLET | Refills: 1 | Status: SHIPPED | OUTPATIENT
Start: 2020-03-12 | End: 2020-05-19

## 2020-03-24 DIAGNOSIS — J30.9 ALLERGIC RHINITIS WITH POSTNASAL DRIP: ICD-10-CM

## 2020-03-24 DIAGNOSIS — R09.82 ALLERGIC RHINITIS WITH POSTNASAL DRIP: ICD-10-CM

## 2020-03-24 RX ORDER — CETIRIZINE HYDROCHLORIDE 10 MG/1
TABLET ORAL
Qty: 30 TABLET | Refills: 1 | Status: SHIPPED | OUTPATIENT
Start: 2020-03-24 | End: 2020-04-18

## 2020-04-09 ENCOUNTER — TELEPHONE (OUTPATIENT)
Dept: NEUROLOGY | Facility: CLINIC | Age: 30
End: 2020-04-09

## 2020-04-09 ENCOUNTER — TELEPHONE (OUTPATIENT)
Dept: FAMILY MEDICINE | Facility: CLINIC | Age: 30
End: 2020-04-09

## 2020-04-09 NOTE — TELEPHONE ENCOUNTER
I returned pt's mothers call. Pt has symptoms below. Her father is positive for covid. I sent a message to pcp/on call staff to have them contact her.  ----- Message from Anibal Diaz sent at 4/9/2020  3:30 PM CDT -----  Contact: pt mother/Viv  Please call pt mother at [phone]    Questions regarding patient having fever for 3 days & excessive sleeping    Patient mother stated that the patient may have been exposed to the COVID 19 last week    Also patient mother was given the [phone] option 2 for symptoms and speak to a nurse    Thank you

## 2020-04-09 NOTE — TELEPHONE ENCOUNTER
----- Message from Karena Bonilla sent at 4/9/2020  3:55 PM CDT -----  Contact: Patient  Patient calling to get a call back     Please [phone]

## 2020-04-09 NOTE — TELEPHONE ENCOUNTER
----- Message from Amy Avalos RN sent at 4/9/2020  4:35 PM CDT -----  Contact: pt mother/Viv Davila, mother has been trying to get in touch with you. Is there someone on call?  ----- Message -----  From: Anibal Diaz  Sent: 4/9/2020   3:30 PM CDT  To: Yuridia GARCIA Staff    Please call pt mother at [phone]    Questions regarding patient having fever for 3 days & excessive sleeping    Patient mother stated that the patient may have been exposed to the COVID 19 last week    Also patient mother was given the [phone] option 2 for symptoms and speak to a nurse    Thank you

## 2020-04-09 NOTE — TELEPHONE ENCOUNTER
"Patients mom calls concerned about her daughter  Thinks she has COVID-19  "Has not been tested but she states she knows her daughter has it "  Her daughter is nonverbal and she doesn't think she will let staff swab her (would resist)   "pt has been exposed to uncle  recently from COVID-19"  She has a low grade 99.0 (has been taking tyneol), sore throat,   Pt has only been eating jello , drinking fluids.   Please advise.  No sob yet.    "

## 2020-04-18 DIAGNOSIS — J30.9 ALLERGIC RHINITIS WITH POSTNASAL DRIP: ICD-10-CM

## 2020-04-18 DIAGNOSIS — R09.82 ALLERGIC RHINITIS WITH POSTNASAL DRIP: ICD-10-CM

## 2020-04-18 RX ORDER — CETIRIZINE HYDROCHLORIDE 10 MG/1
TABLET ORAL
Qty: 30 TABLET | Refills: 1 | Status: SHIPPED | OUTPATIENT
Start: 2020-04-18 | End: 2020-05-12

## 2020-04-25 RX ORDER — CLOBAZAM 20 MG/1
TABLET ORAL
Qty: 60 TABLET | Refills: 1 | Status: SHIPPED | OUTPATIENT
Start: 2020-04-25 | End: 2020-07-01

## 2020-05-07 DIAGNOSIS — G40.909 SEIZURE DISORDER: ICD-10-CM

## 2020-05-07 RX ORDER — VIGABATRIN 500 MG/1
1500 TABLET, FILM COATED ORAL 2 TIMES DAILY
Qty: 540 TABLET | Refills: 3 | Status: SHIPPED | OUTPATIENT
Start: 2020-05-07 | End: 2022-02-17 | Stop reason: SDUPTHER

## 2020-05-07 NOTE — TELEPHONE ENCOUNTER
----- Message from Jose Cardoso sent at 5/7/2020  2:27 PM CDT -----  Pharmacy Calling    Reason for call: refill, martha yuan ([address] [place] [address] [place] [id number])    Pharmacy Name: [facility]    Prescription Name: vigabatrin (SABRIL) 500 mg PwPk    Phone Number: [phone]    Additional Information:

## 2020-05-07 NOTE — TELEPHONE ENCOUNTER
----- Message from Jose Cardoso sent at 5/7/2020  2:27 PM CDT -----  Pharmacy Calling    Reason for call: refill, martha yuan ([address] [place] [address] [place] [address])    Pharmacy Name: [facility]    Prescription Name: vigabatrin (SABRIL) 500 mg PwPk    Phone Number: [phone]    Additional Information:

## 2020-05-12 ENCOUNTER — TELEPHONE (OUTPATIENT)
Dept: NEUROLOGY | Facility: CLINIC | Age: 30
End: 2020-05-12

## 2020-05-12 DIAGNOSIS — R09.82 ALLERGIC RHINITIS WITH POSTNASAL DRIP: ICD-10-CM

## 2020-05-12 DIAGNOSIS — J30.9 ALLERGIC RHINITIS WITH POSTNASAL DRIP: ICD-10-CM

## 2020-05-12 RX ORDER — CETIRIZINE HYDROCHLORIDE 10 MG/1
TABLET ORAL
Qty: 30 TABLET | Refills: 1 | Status: SHIPPED | OUTPATIENT
Start: 2020-05-12 | End: 2020-06-06

## 2020-05-12 NOTE — TELEPHONE ENCOUNTER
----- Message from Anibal Diaz sent at 5/12/2020 10:48 AM CDT -----  Contact: pt mother  Please call pt mother at [phone]    Refill request for SABRIL tablet    Use [facility] - [place], [place] - [address] [phone] (Phone)  [phone] (Fax)    Patient is out of medication    Thank you

## 2020-05-15 ENCOUNTER — TELEPHONE (OUTPATIENT)
Dept: NEUROLOGY | Facility: CLINIC | Age: 30
End: 2020-05-15

## 2020-05-15 NOTE — TELEPHONE ENCOUNTER
We received fax of further pa questions for elliott approval pa [id number] and these were answered and sent back via fax and fax arrival was confirmed

## 2020-05-15 NOTE — TELEPHONE ENCOUNTER
I returned message by [facility] pharmacy that vigabatrin needed a pa- initiated a pa for sabril/vigabatrin as suggested ref # [id number] via phone [facility]. Phone # [phone]

## 2020-05-18 ENCOUNTER — TELEPHONE (OUTPATIENT)
Dept: NEUROLOGY | Facility: CLINIC | Age: 30
End: 2020-05-18

## 2020-05-19 ENCOUNTER — TELEPHONE (OUTPATIENT)
Dept: FAMILY MEDICINE | Facility: CLINIC | Age: 30
End: 2020-05-19

## 2020-05-19 RX ORDER — LACOSAMIDE 200 MG/1
TABLET, FILM COATED ORAL
Qty: 30 TABLET | Refills: 1 | Status: SHIPPED | OUTPATIENT
Start: 2020-05-19 | End: 2020-07-20

## 2020-05-26 ENCOUNTER — OFFICE VISIT (OUTPATIENT)
Dept: FAMILY MEDICINE | Facility: CLINIC | Age: 30
End: 2020-05-26
Payer: MEDICARE

## 2020-05-26 VITALS
OXYGEN SATURATION: 96 % | BODY MASS INDEX: 35.48 KG/M2 | TEMPERATURE: 99 F | HEIGHT: 64 IN | SYSTOLIC BLOOD PRESSURE: 120 MMHG | DIASTOLIC BLOOD PRESSURE: 86 MMHG | HEART RATE: 97 BPM | WEIGHT: 207.81 LBS

## 2020-05-26 DIAGNOSIS — R27.8 DYSMETRIA: ICD-10-CM

## 2020-05-26 DIAGNOSIS — G40.909 SEIZURE DISORDER: ICD-10-CM

## 2020-05-26 DIAGNOSIS — Z13.6 SCREENING FOR CARDIOVASCULAR CONDITION: ICD-10-CM

## 2020-05-26 DIAGNOSIS — R63.5 WEIGHT GAIN: ICD-10-CM

## 2020-05-26 DIAGNOSIS — Z51.81 THERAPEUTIC DRUG MONITORING: ICD-10-CM

## 2020-05-26 DIAGNOSIS — G40.901 STATUS EPILEPTICUS: Primary | ICD-10-CM

## 2020-05-26 DIAGNOSIS — R09.82 ALLERGIC RHINITIS WITH POSTNASAL DRIP: ICD-10-CM

## 2020-05-26 DIAGNOSIS — J30.9 ALLERGIC RHINITIS WITH POSTNASAL DRIP: ICD-10-CM

## 2020-05-26 DIAGNOSIS — Z00.00 ROUTINE HEALTH MAINTENANCE: ICD-10-CM

## 2020-05-26 PROCEDURE — 99499 UNLISTED E&M SERVICE: CPT | Mod: S$GLB,,, | Performed by: FAMILY MEDICINE

## 2020-05-26 PROCEDURE — 99214 OFFICE O/P EST MOD 30 MIN: CPT | Mod: S$GLB,,, | Performed by: FAMILY MEDICINE

## 2020-05-26 PROCEDURE — 99499 RISK ADDL DX/OHS AUDIT: ICD-10-PCS | Mod: S$GLB,,, | Performed by: FAMILY MEDICINE

## 2020-05-26 PROCEDURE — 3008F PR BODY MASS INDEX (BMI) DOCUMENTED: ICD-10-PCS | Mod: CPTII,S$GLB,, | Performed by: FAMILY MEDICINE

## 2020-05-26 PROCEDURE — 3008F BODY MASS INDEX DOCD: CPT | Mod: CPTII,S$GLB,, | Performed by: FAMILY MEDICINE

## 2020-05-26 PROCEDURE — 99214 PR OFFICE/OUTPT VISIT, EST, LEVL IV, 30-39 MIN: ICD-10-PCS | Mod: S$GLB,,, | Performed by: FAMILY MEDICINE

## 2020-05-26 NOTE — PROGRESS NOTES
" Patient ID: Deanna Miles is a 29 y.o. female.    Chief Complaint: Check up, boil on leg    HPI      Deanna Miles is a 29 y.o. female here who has autism disorder.  Disorder stable this time.  No new problems.  Seizure disorder-multiple seizure history-no seizures recently.  Skin lesion left posterior thigh.  Was larger now smaller.  Previous electrolyte instability-no new problems recently.  History of COVID like infection.  Vitals:    05/26/20 0935   BP: 120/86   Pulse: 97   Temp: 98.8 °F (37.1 °C)   SpO2: 96%   Weight: 94.3 kg (207 lb 12.5 oz)   Height: 5' 4" (1.626 m)            Review of Symptoms    Constitutional  Neg activity change, No chills /fever   Resp  Neg hemoptysis, stridor, choking  [facility]  Neg chest pain, palpitations  Not able to complete review of systems with pressure cell because of communication difficulties.    Physical Exam    Constitutional:  Oriented person well-developed and well-nourished.  No distress.      HENT  Head: Normocephalic and atraumatic  Right Ear: External ear normal.   Left Ear: External ear normal.   Nose: External nose normal.   Mouth:  Moist mucus membranes.    Eyes:  Conjunctivae are normal. Right eye exhibits no discharge.  Left eye exhibits no discharge. No scleral icterus.  No periorbital edema    Cardiovascular:  Regular rate and rhythm with normal S1 and S2     Pulmonary/Chest:   Clear to auscultation bilaterally without wheezes, rhonchi or rales      Musculoskeletal:  No edema. No obvious deformity No wasting       Neurological:  Alert and oriented to person, place, and time.   Coordination normal.     Skin:   Skin is warm and dry.  No diaphoresis.   No rash noted.     Psychiatric: Normal mood and affect. Behavior is normal.  Judgment and thought content normal.     Complete Blood Count  Lab Results   Component Value Date    RBC 4.28 09/21/2017    HGB 12.8 09/21/2017    HCT 37.8 09/21/2017    MCV 88 09/21/2017    MCH 29.9 09/21/2017    MCHC 33.9 09/21/2017    " RDW 12.5 09/21/2017     (H) 09/21/2017    MPV 9.4 09/21/2017    GRAN 2.5 09/21/2017    GRAN 48.4 09/21/2017    LYMPH 2.2 09/21/2017    LYMPH 41.7 09/21/2017    MONO 0.4 09/21/2017    MONO 7.2 09/21/2017    EOS 0.1 09/21/2017    BASO 0.01 09/21/2017    EOSINOPHIL 2.3 09/21/2017    BASOPHIL 0.2 09/21/2017    DIFFMETHOD Automated 09/21/2017       Comprehensive Metabolic Panel  No results found for: GLU, BUN, CREATININE, NA, K, CL, PROT, ALBUMIN, BILITOT, AST, ALKPHOS, CO2, ALT, ANIONGAP, EGFRNONAA, ESTGFRAFRICA    TSH  No results found for: TSH    Assessment / Plan:      ICD-10-CM ICD-9-CM   1. Status epilepticus G40.901 345.3   2. Seizure disorder G40.909 345.90   3. Therapeutic drug monitoring Z51.81 V58.83   4. Routine health maintenance Z00.00 V70.0   5. Dysmetria R27.8 781.3   6. Weight gain R63.5 783.1   7. Allergic rhinitis with postnasal drip J30.9 477.9    R09.82 784.91   8. Screening for cardiovascular condition Z13.6 V81.2     Status epilepticus  -     Lacosamide (Vimpat); Future; Expected date: 05/26/2020  -     COVID-19 (SARS CoV-2) IgG Antibody; Future; Expected date: 05/26/2020  -     Comprehensive metabolic panel; Future; Expected date: 05/26/2020  -     CBC auto differential; Future; Expected date: 05/26/2020  -     Lipid Panel; Future; Expected date: 05/26/2020  -     TSH; Future; Expected date: 05/26/2020  -     T4, free; Future; Expected date: 05/26/2020    Seizure disorder  -     Lacosamide (Vimpat); Future; Expected date: 05/26/2020  -     COVID-19 (SARS CoV-2) IgG Antibody; Future; Expected date: 05/26/2020  -     Comprehensive metabolic panel; Future; Expected date: 05/26/2020  -     CBC auto differential; Future; Expected date: 05/26/2020  -     Lipid Panel; Future; Expected date: 05/26/2020  -     TSH; Future; Expected date: 05/26/2020  -     T4, free; Future; Expected date: 05/26/2020    Therapeutic drug monitoring  -     Lacosamide (Vimpat); Future; Expected date: 05/26/2020  -      Comprehensive metabolic panel; Future; Expected date: 05/26/2020  -     CBC auto differential; Future; Expected date: 05/26/2020  -     Lipid Panel; Future; Expected date: 05/26/2020  -     TSH; Future; Expected date: 05/26/2020  -     T4, free; Future; Expected date: 05/26/2020    Routine health maintenance  -     COVID-19 (SARS CoV-2) IgG Antibody; Future; Expected date: 05/26/2020    Dysmetria  -     Comprehensive metabolic panel; Future; Expected date: 05/26/2020  -     CBC auto differential; Future; Expected date: 05/26/2020  -     Lipid Panel; Future; Expected date: 05/26/2020  -     TSH; Future; Expected date: 05/26/2020  -     T4, free; Future; Expected date: 05/26/2020    Weight gain  -     Lacosamide (Vimpat); Future; Expected date: 05/26/2020  -     Comprehensive metabolic panel; Future; Expected date: 05/26/2020  -     CBC auto differential; Future; Expected date: 05/26/2020  -     Lipid Panel; Future; Expected date: 05/26/2020  -     TSH; Future; Expected date: 05/26/2020  -     T4, free; Future; Expected date: 05/26/2020    Allergic rhinitis with postnasal drip    Screening for cardiovascular condition  -     Lipid Panel; Future; Expected date: 05/26/2020     Continue current medication-patient had lost lot of weight now is gaining weight-lost weight after presumed COVID infection

## 2020-06-06 DIAGNOSIS — R09.82 ALLERGIC RHINITIS WITH POSTNASAL DRIP: ICD-10-CM

## 2020-06-06 DIAGNOSIS — J30.9 ALLERGIC RHINITIS WITH POSTNASAL DRIP: ICD-10-CM

## 2020-06-06 RX ORDER — CETIRIZINE HYDROCHLORIDE 10 MG/1
TABLET ORAL
Qty: 30 TABLET | Refills: 1 | Status: SHIPPED | OUTPATIENT
Start: 2020-06-06 | End: 2020-07-03

## 2020-06-11 ENCOUNTER — TELEPHONE (OUTPATIENT)
Dept: FAMILY MEDICINE | Facility: CLINIC | Age: 30
End: 2020-06-11

## 2020-06-11 NOTE — TELEPHONE ENCOUNTER
I called and notified the pt mom per dr herron     Labs Results are normal  I mild anemia but I do not think we should do any further work up.

## 2020-06-25 ENCOUNTER — TELEPHONE (OUTPATIENT)
Dept: FAMILY MEDICINE | Facility: CLINIC | Age: 30
End: 2020-06-25

## 2020-07-03 DIAGNOSIS — R09.82 ALLERGIC RHINITIS WITH POSTNASAL DRIP: ICD-10-CM

## 2020-07-03 DIAGNOSIS — J30.9 ALLERGIC RHINITIS WITH POSTNASAL DRIP: ICD-10-CM

## 2020-07-03 RX ORDER — CETIRIZINE HYDROCHLORIDE 10 MG/1
TABLET ORAL
Qty: 30 TABLET | Refills: 1 | Status: SHIPPED | OUTPATIENT
Start: 2020-07-03 | End: 2020-07-25

## 2020-07-20 RX ORDER — LACOSAMIDE 200 MG/1
TABLET, FILM COATED ORAL
Qty: 30 TABLET | Refills: 1 | Status: SHIPPED | OUTPATIENT
Start: 2020-07-20 | End: 2020-09-18

## 2020-07-25 DIAGNOSIS — J30.9 ALLERGIC RHINITIS WITH POSTNASAL DRIP: ICD-10-CM

## 2020-07-25 DIAGNOSIS — R09.82 ALLERGIC RHINITIS WITH POSTNASAL DRIP: ICD-10-CM

## 2020-07-25 RX ORDER — CETIRIZINE HYDROCHLORIDE 10 MG/1
TABLET ORAL
Qty: 30 TABLET | Refills: 11 | Status: SHIPPED | OUTPATIENT
Start: 2020-07-25 | End: 2021-06-29

## 2020-07-28 ENCOUNTER — TELEPHONE (OUTPATIENT)
Dept: FAMILY MEDICINE | Facility: CLINIC | Age: 30
End: 2020-07-28

## 2020-07-28 NOTE — LETTER
July 28, 2020    Deanna Miles  [address]  [facility] [address]         [facility]  [address] [address]  Phone: [phone]  Fax: [phone] July 28, 2020     Patient: Deanna Miles   YOB: 1990   Date of Visit: 7/28/2020       To Whom It May Concern:    It is my medical opinion that Deanna Miles has significant autism with resulting cognitive deficiencies that require 24 hour care.  She cannot stay home alone for her safety.    If you have any questions or concerns, please don't hesitate to call.    Sincerely,        Earline Braxton MA

## 2020-07-28 NOTE — TELEPHONE ENCOUNTER
----- Message from Chichi Christina sent at 7/28/2020  3:04 PM CDT -----  Contact: Viv, pt mother, [phone]  Requests to speak with you regarding getting a letter stating authistic patient can't stay at home alone. [facility] needs this in order for pt to get sitter for extended hours. Please call.

## 2020-09-18 RX ORDER — LACOSAMIDE 200 MG/1
TABLET, FILM COATED ORAL
Qty: 30 TABLET | Refills: 1 | Status: SHIPPED | OUTPATIENT
Start: 2020-09-18 | End: 2020-11-24

## 2020-09-29 RX ORDER — CLOBAZAM 20 MG/1
TABLET ORAL
Qty: 60 TABLET | Refills: 1 | Status: SHIPPED | OUTPATIENT
Start: 2020-09-29 | End: 2020-12-30

## 2020-11-24 RX ORDER — LACOSAMIDE 200 MG/1
TABLET, FILM COATED ORAL
Qty: 30 TABLET | Refills: 1 | Status: SHIPPED | OUTPATIENT
Start: 2020-11-24 | End: 2021-01-29

## 2020-12-19 ENCOUNTER — HOSPITAL ENCOUNTER (EMERGENCY)
Facility: HOSPITAL | Age: 30
Discharge: HOME | End: 2020-12-19
Attending: EMERGENCY MEDICINE
Payer: MEDICARE

## 2020-12-19 VITALS
OXYGEN SATURATION: 98 % | HEART RATE: 90 BPM | HEIGHT: 62 IN | TEMPERATURE: 99 F | WEIGHT: 207 LBS | DIASTOLIC BLOOD PRESSURE: 77 MMHG | RESPIRATION RATE: 16 BRPM | SYSTOLIC BLOOD PRESSURE: 116 MMHG | BODY MASS INDEX: 38.09 KG/M2

## 2020-12-19 DIAGNOSIS — K04.7 DENTAL INFECTION: Primary | ICD-10-CM

## 2020-12-19 DIAGNOSIS — K11.5 SIALOLITHIASIS: ICD-10-CM

## 2020-12-19 DIAGNOSIS — R56.9 SEIZURE: ICD-10-CM

## 2020-12-19 LAB
ALBUMIN SERPL BCP-MCNC: 3.8 G/DL (ref 3.5–5.2)
ALP SERPL-CCNC: 65 U/L (ref 55–135)
ALT SERPL W/O P-5'-P-CCNC: <5 U/L (ref 10–44)
ANION GAP SERPL CALC-SCNC: 12 MMOL/L (ref 8–16)
AST SERPL-CCNC: 11 U/L (ref 10–40)
BACTERIA #/AREA URNS AUTO: ABNORMAL /HPF
BASOPHILS # BLD AUTO: 0.02 K/UL (ref 0–0.2)
BASOPHILS NFR BLD: 0.3 % (ref 0–1.9)
BILIRUB SERPL-MCNC: 0.4 MG/DL (ref 0.1–1)
BILIRUB UR QL STRIP: NEGATIVE
BUN SERPL-MCNC: 7 MG/DL (ref 6–20)
CALCIUM SERPL-MCNC: 9 MG/DL (ref 8.7–10.5)
CHLORIDE SERPL-SCNC: 108 MMOL/L (ref 95–110)
CLARITY UR REFRACT.AUTO: ABNORMAL
CO2 SERPL-SCNC: 22 MMOL/L (ref 23–29)
COLOR UR AUTO: YELLOW
CREAT SERPL-MCNC: 0.7 MG/DL (ref 0.5–1.4)
DIFFERENTIAL METHOD: ABNORMAL
EOSINOPHIL # BLD AUTO: 0 K/UL (ref 0–0.5)
EOSINOPHIL NFR BLD: 0.2 % (ref 0–8)
ERYTHROCYTE [DISTWIDTH] IN BLOOD BY AUTOMATED COUNT: 12.2 % (ref 11.5–14.5)
EST. GFR  (AFRICAN AMERICAN): >60 ML/MIN/1.73 M^2
EST. GFR  (NON AFRICAN AMERICAN): >60 ML/MIN/1.73 M^2
GLUCOSE SERPL-MCNC: 81 MG/DL (ref 70–110)
GLUCOSE UR QL STRIP: NEGATIVE
HCT VFR BLD AUTO: 40.1 % (ref 37–48.5)
HGB BLD-MCNC: 12.7 G/DL (ref 12–16)
HGB UR QL STRIP: ABNORMAL
HYALINE CASTS UR QL AUTO: 0 /LPF
IMM GRANULOCYTES # BLD AUTO: 0.01 K/UL (ref 0–0.04)
IMM GRANULOCYTES NFR BLD AUTO: 0.2 % (ref 0–0.5)
KETONES UR QL STRIP: NEGATIVE
LEUKOCYTE ESTERASE UR QL STRIP: NEGATIVE
LYMPHOCYTES # BLD AUTO: 1.3 K/UL (ref 1–4.8)
LYMPHOCYTES NFR BLD: 21.1 % (ref 18–48)
MCH RBC QN AUTO: 29.8 PG (ref 27–31)
MCHC RBC AUTO-ENTMCNC: 31.7 G/DL (ref 32–36)
MCV RBC AUTO: 94 FL (ref 82–98)
MICROSCOPIC COMMENT: ABNORMAL
MONOCYTES # BLD AUTO: 0.5 K/UL (ref 0.3–1)
MONOCYTES NFR BLD: 8.1 % (ref 4–15)
NEUTROPHILS # BLD AUTO: 4.4 K/UL (ref 1.8–7.7)
NEUTROPHILS NFR BLD: 70.1 % (ref 38–73)
NITRITE UR QL STRIP: NEGATIVE
NRBC BLD-RTO: 0 /100 WBC
PH UR STRIP: 6 [PH] (ref 5–8)
PLATELET # BLD AUTO: 366 K/UL (ref 150–350)
PMV BLD AUTO: 10.3 FL (ref 9.2–12.9)
POTASSIUM SERPL-SCNC: 3.5 MMOL/L (ref 3.5–5.1)
PROT SERPL-MCNC: 7.9 G/DL (ref 6–8.4)
PROT UR QL STRIP: ABNORMAL
RBC # BLD AUTO: 4.26 M/UL (ref 4–5.4)
RBC #/AREA URNS AUTO: 15 /HPF (ref 0–4)
SODIUM SERPL-SCNC: 142 MMOL/L (ref 136–145)
SP GR UR STRIP: 1.03 (ref 1–1.03)
SQUAMOUS #/AREA URNS AUTO: 8 /HPF
URN SPEC COLLECT METH UR: ABNORMAL
WBC # BLD AUTO: 6.27 K/UL (ref 3.9–12.7)
WBC #/AREA URNS AUTO: 2 /HPF (ref 0–5)

## 2020-12-19 PROCEDURE — 99283 EMERGENCY DEPT VISIT LOW MDM: CPT

## 2020-12-19 PROCEDURE — 85025 COMPLETE CBC W/AUTO DIFF WBC: CPT

## 2020-12-19 PROCEDURE — 99285 PR EMERGENCY DEPT VISIT,LEVEL V: ICD-10-PCS | Mod: ,,, | Performed by: EMERGENCY MEDICINE

## 2020-12-19 PROCEDURE — 80053 COMPREHEN METABOLIC PANEL: CPT

## 2020-12-19 PROCEDURE — 80339 ANTIEPILEPTICS NOS 1-3: CPT

## 2020-12-19 PROCEDURE — 80235 DRUG ASSAY LACOSAMIDE: CPT

## 2020-12-19 PROCEDURE — 99285 EMERGENCY DEPT VISIT HI MDM: CPT | Mod: ,,, | Performed by: EMERGENCY MEDICINE

## 2020-12-19 PROCEDURE — 25000003 PHARM REV CODE 250: Performed by: EMERGENCY MEDICINE

## 2020-12-19 PROCEDURE — 81001 URINALYSIS AUTO W/SCOPE: CPT | Mod: 59

## 2020-12-19 RX ORDER — AMOXICILLIN AND CLAVULANATE POTASSIUM 875; 125 MG/1; MG/1
1 TABLET, FILM COATED ORAL 2 TIMES DAILY
Qty: 14 TABLET | Refills: 0 | Status: SHIPPED | OUTPATIENT
Start: 2020-12-19 | End: 2021-02-18

## 2020-12-19 RX ORDER — AMOXICILLIN AND CLAVULANATE POTASSIUM 875; 125 MG/1; MG/1
1 TABLET, FILM COATED ORAL
Status: COMPLETED | OUTPATIENT
Start: 2020-12-19 | End: 2020-12-19

## 2020-12-19 RX ADMIN — AMOXICILLIN AND CLAVULANATE POTASSIUM 1 TABLET: 875; 125 TABLET, FILM COATED ORAL at 03:12

## 2020-12-19 NOTE — DISCHARGE INSTRUCTIONS
Your child was seen in the emergency department for seizures and facial swelling which may be due to a dental infection as well as possible salivary gland stones.  Give her Augmentin as prescribed.  Encourage sucking on sour foods to promote salivation.  Please follow-up with her neurologist early this week to evaluate her seizure medications.    Return to the emergency department for any worsening swelling, changes in eating habits, fever or chills, recurrent seizures, or other concerning symptoms.

## 2020-12-19 NOTE — ED NOTES
"Patient identifiers for Deanna Miles 30 y.o. female checked and correct.  Chief Complaint   Patient presents with    Seizures     Pt. mother reports she believes the pt. has been having "mini" seizures for the past 2 days. Pt. has Hx. of seizures. Mother also repoorts a "knot" on the right side of her face. Pt. has been refusing to eat for the past three days.      Past Medical History:   Diagnosis Date    Autism disorder     Encounter for blood transfusion     Seizures     Strabismus      Allergies reported:   Review of patient's allergies indicates:   Allergen Reactions    Loratadine Diarrhea     All allergy medicines    Milk containing products Diarrhea         LOC: Patient is awake, alert, and aware of environment with an appropriate affect. Patient has intellectual delay since birth, pt's mother at bedside.  APPEARANCE: Patient resting comfortably and in no acute distress. Patient is clean and well groomed, patient's clothing is properly fastened.  HEENT: Pt presents with surgical mask on. Pt right side of lower face is more swollen than left according to mother.  SKIN: The skin is warm and dry. Patient has normal skin turgor and moist mucus membranes.   MUSKULOSKELETAL: Patient is moving all extremities well, no obvious deformities noted. Pulses intact.   RESPIRATORY: Airway is open and patent. Respirations are spontaneous and non-labored with normal effort and rate.  CARDIAC: Patient is tachycardic, NSR. 108 on cardiac monitor. No peripheral edema noted.   ABDOMEN: No distention noted. Soft and non-tender upon palpation.  NEUROLOGICAL: pupils 3mm, PERRL. Facial expression is symmetrical. Hand grasps are equal bilaterally. Normal sensation in all extremities when touched with finger.          "

## 2020-12-19 NOTE — ED PROVIDER NOTES
"Encounter Date: 12/19/2020       History     Chief Complaint   Patient presents with    Seizures     Pt. mother reports she believes the pt. has been having "mini" seizures for the past 2 days. Pt. has Hx. of seizures. Mother also repoorts a "knot" on the right side of her face. Pt. has been refusing to eat for the past three days.      30-year-old female with past medical history of autism, seizure disorder, presenting with 2 days of absent seizures.  Mother states that patient has had 2-3 episodes per day of seizure activity which she describes as staring off into the distance", lasting a minute or so each time, for the past 2 days.  She also states patient has a decreased appetite.  She is concerned that patient has a dental infection, as she has noticed a swelling at patient's right mandible, although she states it swells up at night and improves during the day.  She states patient has been grabbing towards the right side of her face occasionally.  Denies fevers, cough, runny nose, diarrhea.        Review of patient's allergies indicates:   Allergen Reactions    Loratadine Diarrhea     All allergy medicines    Milk containing products Diarrhea     Past Medical History:   Diagnosis Date    Autism disorder     Encounter for blood transfusion     Seizures     Strabismus      Past Surgical History:   Procedure Laterality Date    EYE SURGERY      STRABISMUS SURGERY       Family History   Problem Relation Age of Onset    Diabetes Father     Hypertension Father     Cataracts Maternal Aunt     Diabetes Paternal Aunt     Diabetes Paternal Uncle     Diabetes Paternal Grandmother     Hypertension Paternal Grandmother     No Known Problems Mother     No Known Problems Sister     No Known Problems Brother     No Known Problems Maternal Uncle     No Known Problems Maternal Grandmother     No Known Problems Maternal Grandfather     No Known Problems Paternal Grandfather     Amblyopia Neg Hx     " Blindness Neg Hx     Cancer Neg Hx     Glaucoma Neg Hx     Macular degeneration Neg Hx     Retinal detachment Neg Hx     Strabismus Neg Hx     Stroke Neg Hx     Thyroid disease Neg Hx      Social History     Tobacco Use    Smoking status: Never Smoker   Substance Use Topics    Alcohol use: No    Drug use: No     Review of Systems   Unable to perform ROS: Patient nonverbal       Physical Exam     Initial Vitals [12/19/20 1300]   BP Pulse Resp Temp SpO2   114/77 107 18 -- 97 %      MAP       --         Physical Exam    Nursing note and vitals reviewed.  Constitutional: She appears well-developed and well-nourished. She is not diaphoretic. No distress.   Laughing and interactive   HENT:   Head: Normocephalic and atraumatic.   Nose: Nose normal.   Mild right mandibular swelling, nontender, nonfluctuant.  Overall poor dentition, with caries appreciated at right lower cuspid, but no periapical abscess appreciated    Eyes: Conjunctivae and EOM are normal. Pupils are equal, round, and reactive to light. No scleral icterus.   Neck: Normal range of motion. Neck supple.   Cardiovascular: Normal rate, regular rhythm and intact distal pulses.   Pulmonary/Chest: Breath sounds normal. No stridor. No respiratory distress. She has no wheezes. She has no rhonchi. She has no rales. She exhibits no tenderness.   Abdominal: Soft. Bowel sounds are normal. She exhibits no distension. There is no abdominal tenderness.   Musculoskeletal: Normal range of motion. No tenderness or edema.   Lymphadenopathy:     She has no cervical adenopathy.   Neurological: She is alert. She has normal strength.   At mental status baseline per mother   Skin: Skin is warm and dry. Capillary refill takes less than 2 seconds. No rash and no abscess noted. No erythema. No pallor.   Psychiatric: She has a normal mood and affect.         ED Course   Procedures  Labs Reviewed - No data to display       Imaging Results    None          Medical Decision  Making:   History:   I obtained history from: someone other than patient.       <> Summary of History: Mother at bedside, see HPI  Old Medical Records: I decided to obtain old medical records.  Initial Assessment:   Patient is alert and interactive, mental status baseline, vital signs stable, diffusely poor dentition with caries appreciated at right lower cuspid, however no periapical abscess appreciated, no gingival or buccal induration, neck supple without lymphadenopathy normal range of movement.  Differential Diagnosis:   Waxing and waning swelling and mandibular angle may be due to sialolith, less likely abscess although patient does have poor dentition.  Will obtain CBC, CMP, urinalysis to look for electrolyte abnormalities or source of infection.  Will level patient's AEDs due to possible subtherapeutic levels.  Differential also includes but not limited to electrolyte or metabolic abnormality.  I have considered but do not suspect intracranial injury as NC/AT and patient currently acting normally.  Clinical Tests:   Lab Tests: Ordered and Reviewed  ED Management:  Patient takes Sabril, Vimpat, and occasional Onfi.  Patient mother states that although patient had COVID in the spring, she did not have breakthrough seizures at that time.    Patient has had no further seizure or other abnormal activity during the ED visit.  Labs within normal limits, showing no leukocytosis concerning for infection, and urinalysis negative for infection.  Given poor dentition with possible dental infection, recommended sialagogues and will start patient on Augmentin but advised mother to follow up with neurologist Monday for re-evaluation of antiepileptic medications and levels of AEDs.     I discussed outpatient follow up and return precautions with pt and answered all questions.                               Clinical Impression:     ICD-10-CM ICD-9-CM   1. Dental infection  K04.7 522.4   2. Sialolithiasis  K11.5 527.5   3.  Seizure  R56.9 780.39                                               Deneen Fatima MD  12/21/20 1517

## 2020-12-22 ENCOUNTER — TELEPHONE (OUTPATIENT)
Dept: FAMILY MEDICINE | Facility: CLINIC | Age: 30
End: 2020-12-22

## 2020-12-22 RX ORDER — DIAZEPAM 5 MG/1
TABLET ORAL
Qty: 3 TABLET | Refills: 0 | Status: SHIPPED | OUTPATIENT
Start: 2020-12-22 | End: 2021-05-24

## 2020-12-22 NOTE — TELEPHONE ENCOUNTER
----- Message from Naima العراقي sent at 12/22/2020  8:09 AM CST -----  Type:  Needs Medical Advice    Who Called:   Reason:Request valium due to having dental work that needs to be done   Would the patient rather a call back or a response via [facility]? call  Best Call Back Number: [phone]  Additional Information: none

## 2020-12-23 LAB — LACOSAMIDE: 2 MCG/ML (ref 1–10)

## 2020-12-26 LAB — VIGABATRIN: 14 UG/ML

## 2020-12-30 RX ORDER — CLOBAZAM 20 MG/1
TABLET ORAL
Qty: 60 TABLET | Refills: 1 | Status: SHIPPED | OUTPATIENT
Start: 2020-12-30 | End: 2021-05-26

## 2021-02-12 ENCOUNTER — TELEPHONE (OUTPATIENT)
Dept: FAMILY MEDICINE | Facility: CLINIC | Age: 31
End: 2021-02-12

## 2021-02-18 ENCOUNTER — OFFICE VISIT (OUTPATIENT)
Dept: FAMILY MEDICINE | Facility: CLINIC | Age: 31
End: 2021-02-18
Payer: MEDICARE

## 2021-02-18 ENCOUNTER — TELEPHONE (OUTPATIENT)
Dept: FAMILY MEDICINE | Facility: CLINIC | Age: 31
End: 2021-02-18

## 2021-02-18 VITALS
DIASTOLIC BLOOD PRESSURE: 70 MMHG | WEIGHT: 222 LBS | HEART RATE: 96 BPM | OXYGEN SATURATION: 95 % | SYSTOLIC BLOOD PRESSURE: 130 MMHG | BODY MASS INDEX: 40.85 KG/M2 | TEMPERATURE: 97 F | HEIGHT: 62 IN

## 2021-02-18 DIAGNOSIS — Z01.818 PRE-OPERATIVE CLEARANCE: Primary | ICD-10-CM

## 2021-02-18 DIAGNOSIS — K04.7 DENTAL INFECTION: ICD-10-CM

## 2021-02-18 PROCEDURE — 99499 RISK ADDL DX/OHS AUDIT: ICD-10-PCS | Mod: S$GLB,,, | Performed by: STUDENT IN AN ORGANIZED HEALTH CARE EDUCATION/TRAINING PROGRAM

## 2021-02-18 PROCEDURE — 99214 OFFICE O/P EST MOD 30 MIN: CPT | Mod: S$GLB,,, | Performed by: STUDENT IN AN ORGANIZED HEALTH CARE EDUCATION/TRAINING PROGRAM

## 2021-02-18 PROCEDURE — 99499 UNLISTED E&M SERVICE: CPT | Mod: S$GLB,,, | Performed by: STUDENT IN AN ORGANIZED HEALTH CARE EDUCATION/TRAINING PROGRAM

## 2021-02-18 PROCEDURE — 1126F AMNT PAIN NOTED NONE PRSNT: CPT | Mod: S$GLB,,, | Performed by: STUDENT IN AN ORGANIZED HEALTH CARE EDUCATION/TRAINING PROGRAM

## 2021-02-18 PROCEDURE — 99214 PR OFFICE/OUTPT VISIT, EST, LEVL IV, 30-39 MIN: ICD-10-PCS | Mod: S$GLB,,, | Performed by: STUDENT IN AN ORGANIZED HEALTH CARE EDUCATION/TRAINING PROGRAM

## 2021-02-18 PROCEDURE — 1126F PR PAIN SEVERITY QUANTIFIED, NO PAIN PRESENT: ICD-10-PCS | Mod: S$GLB,,, | Performed by: STUDENT IN AN ORGANIZED HEALTH CARE EDUCATION/TRAINING PROGRAM

## 2021-02-18 PROCEDURE — 3008F BODY MASS INDEX DOCD: CPT | Mod: CPTII,S$GLB,, | Performed by: STUDENT IN AN ORGANIZED HEALTH CARE EDUCATION/TRAINING PROGRAM

## 2021-02-18 PROCEDURE — 3008F PR BODY MASS INDEX (BMI) DOCUMENTED: ICD-10-PCS | Mod: CPTII,S$GLB,, | Performed by: STUDENT IN AN ORGANIZED HEALTH CARE EDUCATION/TRAINING PROGRAM

## 2021-02-18 RX ORDER — AMOXICILLIN AND CLAVULANATE POTASSIUM 875; 125 MG/1; MG/1
1 TABLET, FILM COATED ORAL 2 TIMES DAILY
Qty: 14 TABLET | Refills: 0 | Status: SHIPPED | OUTPATIENT
Start: 2021-02-18 | End: 2021-04-30 | Stop reason: ALTCHOICE

## 2021-02-18 RX ORDER — CLINDAMYCIN HYDROCHLORIDE 150 MG/1
CAPSULE ORAL
COMMUNITY
Start: 2021-02-09 | End: 2021-02-18 | Stop reason: ALTCHOICE

## 2021-02-18 RX ORDER — HYDROCODONE BITARTRATE AND ACETAMINOPHEN 5; 325 MG/1; MG/1
1 TABLET ORAL EVERY 6 HOURS PRN
COMMUNITY
Start: 2021-02-09 | End: 2021-05-24

## 2021-02-18 RX ORDER — IBUPROFEN 600 MG/1
TABLET ORAL
COMMUNITY
Start: 2021-02-09 | End: 2021-05-24

## 2021-02-19 ENCOUNTER — TELEPHONE (OUTPATIENT)
Dept: FAMILY MEDICINE | Facility: CLINIC | Age: 31
End: 2021-02-19

## 2021-02-22 ENCOUNTER — TELEPHONE (OUTPATIENT)
Dept: NEUROLOGY | Facility: CLINIC | Age: 31
End: 2021-02-22

## 2021-04-05 RX ORDER — LACOSAMIDE 200 MG/1
TABLET, FILM COATED ORAL
Qty: 30 TABLET | Refills: 1 | Status: SHIPPED | OUTPATIENT
Start: 2021-04-05 | End: 2021-06-02

## 2021-04-29 ENCOUNTER — PATIENT OUTREACH (OUTPATIENT)
Dept: ADMINISTRATIVE | Facility: OTHER | Age: 31
End: 2021-04-29

## 2021-04-29 ENCOUNTER — TELEPHONE (OUTPATIENT)
Dept: FAMILY MEDICINE | Facility: CLINIC | Age: 31
End: 2021-04-29

## 2021-04-29 DIAGNOSIS — Z01.818 PREOPERATIVE EXAMINATION: Primary | ICD-10-CM

## 2021-04-30 ENCOUNTER — OFFICE VISIT (OUTPATIENT)
Dept: CARDIOLOGY | Facility: CLINIC | Age: 31
End: 2021-04-30
Payer: MEDICARE

## 2021-04-30 VITALS
HEART RATE: 79 BPM | BODY MASS INDEX: 39.45 KG/M2 | DIASTOLIC BLOOD PRESSURE: 72 MMHG | WEIGHT: 214.38 LBS | HEIGHT: 62 IN | SYSTOLIC BLOOD PRESSURE: 100 MMHG | OXYGEN SATURATION: 98 %

## 2021-04-30 DIAGNOSIS — R94.31 NONSPECIFIC ABNORMAL ELECTROCARDIOGRAM (ECG) (EKG): ICD-10-CM

## 2021-04-30 DIAGNOSIS — R06.02 SOBOE (SHORTNESS OF BREATH ON EXERTION): ICD-10-CM

## 2021-04-30 DIAGNOSIS — Z01.810 PREOP CARDIOVASCULAR EXAM: ICD-10-CM

## 2021-04-30 DIAGNOSIS — E66.09 CLASS 2 OBESITY DUE TO EXCESS CALORIES WITHOUT SERIOUS COMORBIDITY WITH BODY MASS INDEX (BMI) OF 39.0 TO 39.9 IN ADULT: ICD-10-CM

## 2021-04-30 DIAGNOSIS — K02.9 DENTAL CARIES: ICD-10-CM

## 2021-04-30 DIAGNOSIS — F84.0 AUTISM: ICD-10-CM

## 2021-04-30 DIAGNOSIS — Z01.818 PREOPERATIVE EXAMINATION: ICD-10-CM

## 2021-04-30 PROBLEM — E66.812 CLASS 2 OBESITY DUE TO EXCESS CALORIES WITHOUT SERIOUS COMORBIDITY WITH BODY MASS INDEX (BMI) OF 39.0 TO 39.9 IN ADULT: Status: ACTIVE | Noted: 2021-04-30

## 2021-04-30 PROCEDURE — 3008F PR BODY MASS INDEX (BMI) DOCUMENTED: ICD-10-PCS | Mod: CPTII,S$GLB,, | Performed by: INTERNAL MEDICINE

## 2021-04-30 PROCEDURE — 93000 ELECTROCARDIOGRAM COMPLETE: CPT | Mod: S$GLB,,, | Performed by: INTERNAL MEDICINE

## 2021-04-30 PROCEDURE — 1126F AMNT PAIN NOTED NONE PRSNT: CPT | Mod: S$GLB,,, | Performed by: INTERNAL MEDICINE

## 2021-04-30 PROCEDURE — 99999 PR PBB SHADOW E&M-EST. PATIENT-LVL III: CPT | Mod: PBBFAC,,, | Performed by: INTERNAL MEDICINE

## 2021-04-30 PROCEDURE — 99204 PR OFFICE/OUTPT VISIT, NEW, LEVL IV, 45-59 MIN: ICD-10-PCS | Mod: S$GLB,,, | Performed by: INTERNAL MEDICINE

## 2021-04-30 PROCEDURE — 93000 EKG 12-LEAD: ICD-10-PCS | Mod: S$GLB,,, | Performed by: INTERNAL MEDICINE

## 2021-04-30 PROCEDURE — 3008F BODY MASS INDEX DOCD: CPT | Mod: CPTII,S$GLB,, | Performed by: INTERNAL MEDICINE

## 2021-04-30 PROCEDURE — 99204 OFFICE O/P NEW MOD 45 MIN: CPT | Mod: S$GLB,,, | Performed by: INTERNAL MEDICINE

## 2021-04-30 PROCEDURE — 99999 PR PBB SHADOW E&M-EST. PATIENT-LVL III: ICD-10-PCS | Mod: PBBFAC,,, | Performed by: INTERNAL MEDICINE

## 2021-04-30 PROCEDURE — 1126F PR PAIN SEVERITY QUANTIFIED, NO PAIN PRESENT: ICD-10-PCS | Mod: S$GLB,,, | Performed by: INTERNAL MEDICINE

## 2021-05-04 ENCOUNTER — PATIENT MESSAGE (OUTPATIENT)
Dept: RESEARCH | Facility: HOSPITAL | Age: 31
End: 2021-05-04

## 2021-05-24 ENCOUNTER — OFFICE VISIT (OUTPATIENT)
Dept: FAMILY MEDICINE | Facility: CLINIC | Age: 31
End: 2021-05-24
Payer: MEDICARE

## 2021-05-24 VITALS
WEIGHT: 212.94 LBS | DIASTOLIC BLOOD PRESSURE: 60 MMHG | BODY MASS INDEX: 39.19 KG/M2 | SYSTOLIC BLOOD PRESSURE: 110 MMHG | TEMPERATURE: 98 F | HEIGHT: 62 IN | HEART RATE: 98 BPM | OXYGEN SATURATION: 96 %

## 2021-05-24 DIAGNOSIS — K04.7 DENTAL INFECTION: ICD-10-CM

## 2021-05-24 DIAGNOSIS — Z01.818 PREOPERATIVE EXAMINATION: Primary | ICD-10-CM

## 2021-05-24 DIAGNOSIS — G40.901 STATUS EPILEPTICUS: ICD-10-CM

## 2021-05-24 PROCEDURE — 3008F PR BODY MASS INDEX (BMI) DOCUMENTED: ICD-10-PCS | Mod: CPTII,S$GLB,, | Performed by: FAMILY MEDICINE

## 2021-05-24 PROCEDURE — 99213 PR OFFICE/OUTPT VISIT, EST, LEVL III, 20-29 MIN: ICD-10-PCS | Mod: S$GLB,,, | Performed by: FAMILY MEDICINE

## 2021-05-24 PROCEDURE — 99213 OFFICE O/P EST LOW 20 MIN: CPT | Mod: S$GLB,,, | Performed by: FAMILY MEDICINE

## 2021-05-24 PROCEDURE — 1126F PR PAIN SEVERITY QUANTIFIED, NO PAIN PRESENT: ICD-10-PCS | Mod: S$GLB,,, | Performed by: FAMILY MEDICINE

## 2021-05-24 PROCEDURE — 99499 RISK ADDL DX/OHS AUDIT: ICD-10-PCS | Mod: S$GLB,,, | Performed by: FAMILY MEDICINE

## 2021-05-24 PROCEDURE — 1126F AMNT PAIN NOTED NONE PRSNT: CPT | Mod: S$GLB,,, | Performed by: FAMILY MEDICINE

## 2021-05-24 PROCEDURE — 99499 UNLISTED E&M SERVICE: CPT | Mod: S$GLB,,, | Performed by: FAMILY MEDICINE

## 2021-05-24 PROCEDURE — 3008F BODY MASS INDEX DOCD: CPT | Mod: CPTII,S$GLB,, | Performed by: FAMILY MEDICINE

## 2021-05-24 RX ORDER — TERBINAFINE HYDROCHLORIDE 250 MG/1
TABLET ORAL
Qty: 30 TABLET | Refills: 1 | Status: SHIPPED | OUTPATIENT
Start: 2021-05-24 | End: 2022-03-28

## 2021-05-26 RX ORDER — CLOBAZAM 20 MG/1
TABLET ORAL
Qty: 60 TABLET | Refills: 4 | Status: SHIPPED | OUTPATIENT
Start: 2021-05-26 | End: 2021-12-18

## 2021-05-31 ENCOUNTER — TELEPHONE (OUTPATIENT)
Dept: FAMILY MEDICINE | Facility: CLINIC | Age: 31
End: 2021-05-31

## 2021-05-31 ENCOUNTER — PATIENT MESSAGE (OUTPATIENT)
Dept: CARDIOLOGY | Facility: CLINIC | Age: 31
End: 2021-05-31

## 2021-06-02 ENCOUNTER — OFFICE VISIT (OUTPATIENT)
Dept: CARDIOLOGY | Facility: CLINIC | Age: 31
End: 2021-06-02
Payer: MEDICARE

## 2021-06-02 VITALS
HEIGHT: 62 IN | DIASTOLIC BLOOD PRESSURE: 68 MMHG | HEART RATE: 84 BPM | BODY MASS INDEX: 38.74 KG/M2 | SYSTOLIC BLOOD PRESSURE: 100 MMHG | OXYGEN SATURATION: 98 % | WEIGHT: 210.5 LBS

## 2021-06-02 DIAGNOSIS — E66.09 CLASS 2 OBESITY DUE TO EXCESS CALORIES WITHOUT SERIOUS COMORBIDITY WITH BODY MASS INDEX (BMI) OF 39.0 TO 39.9 IN ADULT: ICD-10-CM

## 2021-06-02 DIAGNOSIS — K02.9 DENTAL CARIES: ICD-10-CM

## 2021-06-02 DIAGNOSIS — R06.02 SOBOE (SHORTNESS OF BREATH ON EXERTION): ICD-10-CM

## 2021-06-02 DIAGNOSIS — R94.31 NONSPECIFIC ABNORMAL ELECTROCARDIOGRAM (ECG) (EKG): ICD-10-CM

## 2021-06-02 DIAGNOSIS — Z01.810 PREOP CARDIOVASCULAR EXAM: ICD-10-CM

## 2021-06-02 DIAGNOSIS — F84.0 AUTISM: ICD-10-CM

## 2021-06-02 PROCEDURE — 3008F PR BODY MASS INDEX (BMI) DOCUMENTED: ICD-10-PCS | Mod: CPTII,S$GLB,, | Performed by: INTERNAL MEDICINE

## 2021-06-02 PROCEDURE — 1126F AMNT PAIN NOTED NONE PRSNT: CPT | Mod: S$GLB,,, | Performed by: INTERNAL MEDICINE

## 2021-06-02 PROCEDURE — 99214 PR OFFICE/OUTPT VISIT, EST, LEVL IV, 30-39 MIN: ICD-10-PCS | Mod: S$GLB,,, | Performed by: INTERNAL MEDICINE

## 2021-06-02 PROCEDURE — 99999 PR PBB SHADOW E&M-EST. PATIENT-LVL III: ICD-10-PCS | Mod: PBBFAC,,, | Performed by: INTERNAL MEDICINE

## 2021-06-02 PROCEDURE — 1126F PR PAIN SEVERITY QUANTIFIED, NO PAIN PRESENT: ICD-10-PCS | Mod: S$GLB,,, | Performed by: INTERNAL MEDICINE

## 2021-06-02 PROCEDURE — 99214 OFFICE O/P EST MOD 30 MIN: CPT | Mod: S$GLB,,, | Performed by: INTERNAL MEDICINE

## 2021-06-02 PROCEDURE — 99999 PR PBB SHADOW E&M-EST. PATIENT-LVL III: CPT | Mod: PBBFAC,,, | Performed by: INTERNAL MEDICINE

## 2021-06-02 PROCEDURE — 3008F BODY MASS INDEX DOCD: CPT | Mod: CPTII,S$GLB,, | Performed by: INTERNAL MEDICINE

## 2021-06-02 RX ORDER — LACOSAMIDE 200 MG/1
TABLET, FILM COATED ORAL
Qty: 30 TABLET | Refills: 1 | Status: SHIPPED | OUTPATIENT
Start: 2021-06-02 | End: 2021-08-08

## 2021-06-29 DIAGNOSIS — R09.82 ALLERGIC RHINITIS WITH POSTNASAL DRIP: ICD-10-CM

## 2021-06-29 DIAGNOSIS — J30.9 ALLERGIC RHINITIS WITH POSTNASAL DRIP: ICD-10-CM

## 2021-06-29 RX ORDER — CETIRIZINE HYDROCHLORIDE 10 MG/1
TABLET ORAL
Qty: 90 TABLET | Refills: 3 | Status: SHIPPED | OUTPATIENT
Start: 2021-06-29 | End: 2022-03-28

## 2021-10-18 RX ORDER — LACOSAMIDE 200 MG/1
TABLET, FILM COATED ORAL
Qty: 30 TABLET | Refills: 0 | Status: SHIPPED | OUTPATIENT
Start: 2021-10-18 | End: 2021-11-23

## 2021-10-29 ENCOUNTER — TELEPHONE (OUTPATIENT)
Dept: FAMILY MEDICINE | Facility: CLINIC | Age: 31
End: 2021-10-29
Payer: MEDICAID

## 2021-11-19 ENCOUNTER — OFFICE VISIT (OUTPATIENT)
Dept: FAMILY MEDICINE | Facility: CLINIC | Age: 31
End: 2021-11-19
Payer: MEDICARE

## 2021-11-19 VITALS
OXYGEN SATURATION: 97 % | HEART RATE: 80 BPM | WEIGHT: 216.69 LBS | DIASTOLIC BLOOD PRESSURE: 86 MMHG | TEMPERATURE: 98 F | BODY MASS INDEX: 39.88 KG/M2 | SYSTOLIC BLOOD PRESSURE: 114 MMHG | HEIGHT: 62 IN

## 2021-11-19 DIAGNOSIS — R09.82 ALLERGIC RHINITIS WITH POSTNASAL DRIP: ICD-10-CM

## 2021-11-19 DIAGNOSIS — G40.901 STATUS EPILEPTICUS: Primary | ICD-10-CM

## 2021-11-19 DIAGNOSIS — F84.0 AUTISM: ICD-10-CM

## 2021-11-19 DIAGNOSIS — J30.9 ALLERGIC RHINITIS WITH POSTNASAL DRIP: ICD-10-CM

## 2021-11-19 PROCEDURE — 99499 RISK ADDL DX/OHS AUDIT: ICD-10-PCS | Mod: S$GLB,,, | Performed by: FAMILY MEDICINE

## 2021-11-19 PROCEDURE — 99214 PR OFFICE/OUTPT VISIT, EST, LEVL IV, 30-39 MIN: ICD-10-PCS | Mod: S$GLB,,, | Performed by: FAMILY MEDICINE

## 2021-11-19 PROCEDURE — 99499 UNLISTED E&M SERVICE: CPT | Mod: S$GLB,,, | Performed by: FAMILY MEDICINE

## 2021-11-19 PROCEDURE — 99214 OFFICE O/P EST MOD 30 MIN: CPT | Mod: S$GLB,,, | Performed by: FAMILY MEDICINE

## 2021-11-23 RX ORDER — LACOSAMIDE 200 MG/1
TABLET, FILM COATED ORAL
Qty: 30 TABLET | Refills: 0 | Status: SHIPPED | OUTPATIENT
Start: 2021-11-23 | End: 2021-12-27

## 2021-12-18 RX ORDER — CLOBAZAM 20 MG/1
TABLET ORAL
Qty: 60 TABLET | Refills: 3 | Status: SHIPPED | OUTPATIENT
Start: 2021-12-18 | End: 2022-03-11 | Stop reason: SDUPTHER

## 2021-12-27 RX ORDER — LACOSAMIDE 200 MG/1
TABLET, FILM COATED ORAL
Qty: 30 TABLET | Refills: 4 | Status: SHIPPED | OUTPATIENT
Start: 2021-12-27 | End: 2022-03-11 | Stop reason: SDUPTHER

## 2022-02-16 ENCOUNTER — TELEPHONE (OUTPATIENT)
Dept: NEUROLOGY | Facility: CLINIC | Age: 32
End: 2022-02-16
Payer: MEDICAID

## 2022-02-16 ENCOUNTER — PATIENT MESSAGE (OUTPATIENT)
Dept: NEUROLOGY | Facility: CLINIC | Age: 32
End: 2022-02-16
Payer: MEDICAID

## 2022-02-17 DIAGNOSIS — G40.909 SEIZURE DISORDER: ICD-10-CM

## 2022-02-17 RX ORDER — VIGABATRIN 500 MG/1
1500 TABLET, FILM COATED ORAL 2 TIMES DAILY
Qty: 540 TABLET | Refills: 3 | Status: SHIPPED | OUTPATIENT
Start: 2022-02-17 | End: 2022-02-18 | Stop reason: SDUPTHER

## 2022-02-17 NOTE — TELEPHONE ENCOUNTER
Sabril (brand-name only) 1500 mg twice daily  Follow-up scheduled 03/28/2022 at 13:00 p.m.    Joan Velasquez MD PhD  Neurology-Epilepsy  [facility].        ----- Message from Holly Scott sent at 2/17/2022 12:36 PM CST -----  Contact: Ventura (mother) @ [phone]  Pt needs to be scheduled for an appt asap so she can get her prescription for SABRIL tablet refilled. She was last seen on 7-23-19 by Dr Shi but Dr Fernando filled her last prescription in 2020.  Pls call with an appt.

## 2022-02-18 ENCOUNTER — TELEPHONE (OUTPATIENT)
Dept: NEUROLOGY | Facility: CLINIC | Age: 32
End: 2022-02-18
Payer: MEDICAID

## 2022-02-18 DIAGNOSIS — G40.909 SEIZURE DISORDER: ICD-10-CM

## 2022-02-18 RX ORDER — VIGABATRIN 500 MG/1
1500 TABLET, FILM COATED ORAL 2 TIMES DAILY
Qty: 180 TABLET | Refills: 5 | Status: SHIPPED | OUTPATIENT
Start: 2022-02-18 | End: 2022-02-22 | Stop reason: SDUPTHER

## 2022-02-18 NOTE — TELEPHONE ENCOUNTER
Sabril (brand-name only) 1500 mg twice daily, 30 day supply, 5 refills    Joan Velasquez MD PhD  Neurology-Epilepsy  [facility].            90 day supply denied, 30 day supply approved:    Deanna Miles Key: [id number] - PA Case ID: [id number] help? Call us at [phone]    Outcome  Deniedtoday    [employer] has approved a 30 day supply for the drug listed above but denied your request for a 90 day supply. The drug you requested is a specialty drug. Your Prescription Drug Guide says that specialty drugs are limited to a 30-day supply. You can view your PDG online at [employer]/SearchResources. A full list of pharmacies is also available at The Pharmacy Finder Tool at https://www.[employer]/finder/pharmacy/ or you may call customer care at [phone] (TTY: 541), 8 a.m. 8 p.m. EST, seven days a week for a full list of in-network pharmacies. If receiving your drugs via mail-order delivery is more convenient for you, you can visit our list of in-network mail-order pharmacies at www.[employer]/mail-order (30-day supply limit on some specialty drugs also applies to mail-order). [employer] has approved coverage for the drug listed above, up to a 30-day supply per fill, under your Part D benefit for/through 12-31-22.  Drug  Sabril 500MG tablets  Form  [employer] Electronic PA Form

## 2022-02-18 NOTE — TELEPHONE ENCOUNTER
Expedited PA for name brand only initiated in [facility], 540 tabs/ 90 days, Key [id number]

## 2022-02-22 ENCOUNTER — TELEPHONE (OUTPATIENT)
Dept: NEUROLOGY | Facility: CLINIC | Age: 32
End: 2022-02-22
Payer: MEDICAID

## 2022-02-22 DIAGNOSIS — G40.909 SEIZURE DISORDER: ICD-10-CM

## 2022-02-22 RX ORDER — VIGABATRIN 500 MG/1
1500 TABLET, FILM COATED ORAL 2 TIMES DAILY
Qty: 180 TABLET | Refills: 5 | Status: SHIPPED | OUTPATIENT
Start: 2022-02-22 | End: 2022-02-22

## 2022-02-22 RX ORDER — VIGABATRIN 500 MG/1
1500 TABLET, FILM COATED ORAL 2 TIMES DAILY
Qty: 540 TABLET | Refills: 3 | Status: SHIPPED | OUTPATIENT
Start: 2022-02-22 | End: 2022-08-22 | Stop reason: SDUPTHER

## 2022-02-22 NOTE — TELEPHONE ENCOUNTER
Sabril (brand-name only) 1500 mg twice daily    Discussed over the phone with pharmacist at [facility] specialty pharmacy.  Verbal prescription provided.  Electronic prescription sent as well.  Confirmed that the patient should receive brand-name only medication.    Joan Velasquez MD PhD  Neurology-Epilepsy  [facility].          ----- Message from Ofe Holt sent at 2/22/2022  2:32 PM CST -----  Contact: Sheila (Pharmacist)  Type:  Pharmacy Calling to Clarify an RX    Name of Caller: Sheila (Pharmacist)    Pharmacy Name: [facility]     Prescription Name: SABRIL tablet    What do they need to clarify?: needs to prescribed generic     Best Call Back Number:     Additional Information: would like to get a verbal of this medication

## 2022-03-11 RX ORDER — CLOBAZAM 20 MG/1
20 TABLET ORAL 2 TIMES DAILY
Qty: 60 TABLET | Refills: 3 | Status: SHIPPED | OUTPATIENT
Start: 2022-03-11 | End: 2022-03-28 | Stop reason: SDUPTHER

## 2022-03-11 RX ORDER — LACOSAMIDE 200 MG/1
200 TABLET ORAL DAILY
Qty: 30 TABLET | Refills: 4 | Status: SHIPPED | OUTPATIENT
Start: 2022-03-11 | End: 2022-03-28 | Stop reason: SDUPTHER

## 2022-03-11 NOTE — TELEPHONE ENCOUNTER
----- Message from Josefina Portillo sent at 3/11/2022  3:26 PM CST -----  Regarding: Rx refill  Received refill request via fax from [facility] for  cloBAZam (ONFI) 20 mg Tab  VIMPAT 200 mg Tab tablet

## 2022-03-11 NOTE — TELEPHONE ENCOUNTER
No new care gaps identified.  Powered by [employer]. Reference number: [id number].   3/11/2022 3:31:41 PM CST

## 2022-03-25 ENCOUNTER — PATIENT OUTREACH (OUTPATIENT)
Dept: ADMINISTRATIVE | Facility: OTHER | Age: 32
End: 2022-03-25
Payer: MEDICAID

## 2022-03-25 NOTE — PROGRESS NOTES
Health Maintenance Due   Topic Date Due    Hepatitis C Screening  Never done    COVID-19 Vaccine (1) Never done    HIV Screening  Never done    Influenza Vaccine (1) Never done     Updates were requested from care everywhere.  Chart was reviewed for overdue [facility]ters ([facility]) topics (CRS, Breast Cancer Screening, Eye exam)  Health Maintenance has been updated.  LINKS immunization registry triggered.  Immunizations were reconciled.

## 2022-03-28 ENCOUNTER — OFFICE VISIT (OUTPATIENT)
Dept: NEUROLOGY | Facility: CLINIC | Age: 32
End: 2022-03-28
Payer: MEDICARE

## 2022-03-28 ENCOUNTER — LAB VISIT (OUTPATIENT)
Dept: LAB | Facility: HOSPITAL | Age: 32
End: 2022-03-28
Attending: PSYCHIATRY & NEUROLOGY
Payer: MEDICARE

## 2022-03-28 VITALS
HEART RATE: 80 BPM | DIASTOLIC BLOOD PRESSURE: 80 MMHG | SYSTOLIC BLOOD PRESSURE: 101 MMHG | BODY MASS INDEX: 39.64 KG/M2 | HEIGHT: 62 IN

## 2022-03-28 DIAGNOSIS — F88 GLOBAL DEVELOPMENTAL DELAY: ICD-10-CM

## 2022-03-28 DIAGNOSIS — G40.911 INTRACTABLE EPILEPSY WITH STATUS EPILEPTICUS, UNSPECIFIED EPILEPSY TYPE: Primary | ICD-10-CM

## 2022-03-28 DIAGNOSIS — F84.0 AUTISM: ICD-10-CM

## 2022-03-28 DIAGNOSIS — E66.09 CLASS 2 OBESITY DUE TO EXCESS CALORIES WITHOUT SERIOUS COMORBIDITY WITH BODY MASS INDEX (BMI) OF 39.0 TO 39.9 IN ADULT: ICD-10-CM

## 2022-03-28 DIAGNOSIS — G40.909 SEIZURE DISORDER: ICD-10-CM

## 2022-03-28 DIAGNOSIS — R26.81 GAIT INSTABILITY: ICD-10-CM

## 2022-03-28 LAB
ALBUMIN SERPL BCP-MCNC: 4 G/DL (ref 3.5–5.2)
ALP SERPL-CCNC: 63 U/L (ref 55–135)
ALT SERPL W/O P-5'-P-CCNC: 6 U/L (ref 10–44)
ANION GAP SERPL CALC-SCNC: 9 MMOL/L (ref 8–16)
AST SERPL-CCNC: 16 U/L (ref 10–40)
BILIRUB SERPL-MCNC: 0.3 MG/DL (ref 0.1–1)
BUN SERPL-MCNC: 10 MG/DL (ref 6–20)
CALCIUM SERPL-MCNC: 9.6 MG/DL (ref 8.7–10.5)
CHLORIDE SERPL-SCNC: 107 MMOL/L (ref 95–110)
CO2 SERPL-SCNC: 25 MMOL/L (ref 23–29)
CREAT SERPL-MCNC: 0.7 MG/DL (ref 0.5–1.4)
ERYTHROCYTE [DISTWIDTH] IN BLOOD BY AUTOMATED COUNT: 11.9 % (ref 11.5–14.5)
EST. GFR  (AFRICAN AMERICAN): >60 ML/MIN/1.73 M^2
EST. GFR  (NON AFRICAN AMERICAN): >60 ML/MIN/1.73 M^2
GLUCOSE SERPL-MCNC: 81 MG/DL (ref 70–110)
HCT VFR BLD AUTO: 37.4 % (ref 37–48.5)
HGB BLD-MCNC: 12.4 G/DL (ref 12–16)
MCH RBC QN AUTO: 30 PG (ref 27–31)
MCHC RBC AUTO-ENTMCNC: 33.2 G/DL (ref 32–36)
MCV RBC AUTO: 90 FL (ref 82–98)
PLATELET # BLD AUTO: 370 K/UL (ref 150–450)
PMV BLD AUTO: 9.8 FL (ref 9.2–12.9)
POTASSIUM SERPL-SCNC: 4 MMOL/L (ref 3.5–5.1)
PROT SERPL-MCNC: 7.6 G/DL (ref 6–8.4)
RBC # BLD AUTO: 4.14 M/UL (ref 4–5.4)
SODIUM SERPL-SCNC: 141 MMOL/L (ref 136–145)
WBC # BLD AUTO: 4.69 K/UL (ref 3.9–12.7)

## 2022-03-28 PROCEDURE — 1160F PR REVIEW ALL MEDS BY PRESCRIBER/CLIN PHARMACIST DOCUMENTED: ICD-10-PCS | Mod: CPTII,S$GLB,, | Performed by: PSYCHIATRY & NEUROLOGY

## 2022-03-28 PROCEDURE — 3008F BODY MASS INDEX DOCD: CPT | Mod: CPTII,S$GLB,, | Performed by: PSYCHIATRY & NEUROLOGY

## 2022-03-28 PROCEDURE — 36415 COLL VENOUS BLD VENIPUNCTURE: CPT | Performed by: PSYCHIATRY & NEUROLOGY

## 2022-03-28 PROCEDURE — 99999 PR PBB SHADOW E&M-EST. PATIENT-LVL III: CPT | Mod: PBBFAC,,, | Performed by: PSYCHIATRY & NEUROLOGY

## 2022-03-28 PROCEDURE — 99999 PR PBB SHADOW E&M-EST. PATIENT-LVL III: ICD-10-PCS | Mod: PBBFAC,,, | Performed by: PSYCHIATRY & NEUROLOGY

## 2022-03-28 PROCEDURE — 99215 OFFICE O/P EST HI 40 MIN: CPT | Mod: S$GLB,,, | Performed by: PSYCHIATRY & NEUROLOGY

## 2022-03-28 PROCEDURE — 3074F PR MOST RECENT SYSTOLIC BLOOD PRESSURE < 130 MM HG: ICD-10-PCS | Mod: CPTII,S$GLB,, | Performed by: PSYCHIATRY & NEUROLOGY

## 2022-03-28 PROCEDURE — 3079F PR MOST RECENT DIASTOLIC BLOOD PRESSURE 80-89 MM HG: ICD-10-PCS | Mod: CPTII,S$GLB,, | Performed by: PSYCHIATRY & NEUROLOGY

## 2022-03-28 PROCEDURE — 80235 DRUG ASSAY LACOSAMIDE: CPT | Performed by: PSYCHIATRY & NEUROLOGY

## 2022-03-28 PROCEDURE — 1160F RVW MEDS BY RX/DR IN RCRD: CPT | Mod: CPTII,S$GLB,, | Performed by: PSYCHIATRY & NEUROLOGY

## 2022-03-28 PROCEDURE — 3079F DIAST BP 80-89 MM HG: CPT | Mod: CPTII,S$GLB,, | Performed by: PSYCHIATRY & NEUROLOGY

## 2022-03-28 PROCEDURE — 80053 COMPREHEN METABOLIC PANEL: CPT | Performed by: PSYCHIATRY & NEUROLOGY

## 2022-03-28 PROCEDURE — 1159F MED LIST DOCD IN RCRD: CPT | Mod: CPTII,S$GLB,, | Performed by: PSYCHIATRY & NEUROLOGY

## 2022-03-28 PROCEDURE — 99499 UNLISTED E&M SERVICE: CPT | Mod: HCNC,S$GLB,, | Performed by: PSYCHIATRY & NEUROLOGY

## 2022-03-28 PROCEDURE — 80339 ANTIEPILEPTICS NOS 1-3: CPT | Mod: 91 | Performed by: PSYCHIATRY & NEUROLOGY

## 2022-03-28 PROCEDURE — 99499 RISK ADDL DX/OHS AUDIT: ICD-10-PCS | Mod: HCNC,S$GLB,, | Performed by: PSYCHIATRY & NEUROLOGY

## 2022-03-28 PROCEDURE — 3074F SYST BP LT 130 MM HG: CPT | Mod: CPTII,S$GLB,, | Performed by: PSYCHIATRY & NEUROLOGY

## 2022-03-28 PROCEDURE — 3008F PR BODY MASS INDEX (BMI) DOCUMENTED: ICD-10-PCS | Mod: CPTII,S$GLB,, | Performed by: PSYCHIATRY & NEUROLOGY

## 2022-03-28 PROCEDURE — 85027 COMPLETE CBC AUTOMATED: CPT | Performed by: PSYCHIATRY & NEUROLOGY

## 2022-03-28 PROCEDURE — 80339 ANTIEPILEPTICS NOS 1-3: CPT | Performed by: PSYCHIATRY & NEUROLOGY

## 2022-03-28 PROCEDURE — 99215 PR OFFICE/OUTPT VISIT, EST, LEVL V, 40-54 MIN: ICD-10-PCS | Mod: S$GLB,,, | Performed by: PSYCHIATRY & NEUROLOGY

## 2022-03-28 PROCEDURE — 1159F PR MEDICATION LIST DOCUMENTED IN MEDICAL RECORD: ICD-10-PCS | Mod: CPTII,S$GLB,, | Performed by: PSYCHIATRY & NEUROLOGY

## 2022-03-28 RX ORDER — LACOSAMIDE 200 MG/1
200 TABLET ORAL 2 TIMES DAILY
Qty: 60 TABLET | Refills: 5 | Status: SHIPPED | OUTPATIENT
Start: 2022-03-28 | End: 2022-10-25 | Stop reason: SDUPTHER

## 2022-03-28 RX ORDER — CLOBAZAM 20 MG/1
20 TABLET ORAL 2 TIMES DAILY
Qty: 60 TABLET | Refills: 5 | Status: SHIPPED | OUTPATIENT
Start: 2022-03-28 | End: 2022-10-19

## 2022-03-28 NOTE — PATIENT INSTRUCTIONS
Ms Deanna (Ivana) came [facility] because of her autism and seizure disorder.  Her larger seizures are well controlled on vimpat, vigabatrin, and clobazam but she is still having frequent small staring spells. I would like her to increase the vimpat to 200mg twice daily starting now. She should not miss any doses of her medication. If she does miss a dose, take it as soon as you remember even if that means doubling up on the dose. Please get labs test to check out the blood levels of medications on your way out. I recommend the COVID and flu vaccines for all my patients with epilepsy. For more information, check out these websites: https://www.epilepsyallianceamerica.org/, www.tito-epilepsy.org, www.womenandepilepsy.org.      Please avoid dangerous situations.  If she has a single breakthrough seizure, please patient portal me or call the office and we will decide the next steps. If you have multiple seizures in a row and do not return back to baseline, 911 needs to be called.     Return to clinic in 12 months or sooner with issues.  Please patient portal with any questions or concerns.    Joan Velasquez MD PhD  Neurology-Epilepsy  [facility].

## 2022-03-28 NOTE — PROGRESS NOTES
Name: Deanna Miles  MRN:[id number]   CSN: [id number]  Date of service: 3/28/2022  Age:31 y.o.   Gender:female   Referring Physician/Service: No referring provider defined for this encounter.   The patient is here today with:  Mom, Viv     Neurology Clinic: Initial Visit    CHIEF COMPLAINT:  Autism and epilepsy    HPI 3/28/2022:     Age of first seizure: 1mo  Seizure Risk Factors:  Autism, paternal cousin with autism and seizures, no head strike with LOC, No CNS infections, term  with no prolonged hospitalization, started seeing something was wrong around 4yo   Time of Last Seizure: last big seizure , last staring spell yesterday    # of lifetime Seizures: staring 1000s, maybe 100+ convulsions    Frequency of Seizures: at most 3 staring spells in 1 day, only 1 bigger event in a day, staring spells approximately three days a week, no bigger events since    Seizure Triggers:  When she gets excited  Injuries/Hospitalization for seizures? Lip bite no other injuries, ED maybe 15 times, admitted most of those times   Driving? No   Pregnancy? No   Contraception?  Depo-Provera shot  Folic Acid? No  Bone Health: no dexa      Auras: usually when she is excited but does not always lead to seizures,otherwise no good warning that a seizure is going to happen     Seizure Events:   1. Staring since , 1 minute, tired afterwards, as many as three in a row   2. As a small child - head jerking back and biting on her lip  3. Bigger events - collapse on the floor, arm jerking ()    Current AED/SEs:  1. Vigabatrin 1500 mg twice daily SE none   2. Lacosamide 200 mg once daily SE none   3. Clobazam 20 mg twice daily SE none     Previous AED/SEs or reason for DC.   1. Levetiracetam SE seizures through this   2. Phenobarbital SE when younger     EEG: showed bifrontal slowing     MRI: left MTS      Other Allergies:  Dairy, loratadine      AED compliance, adherence: does not refuse medications    Recent Labs   Lab  "06/09/20  1232 12/19/20  1400   Vigabatrin  --  14.0   Lacosamide 2.7 2.0        ROS 3/28/2022:  Sporadic words (dog, daddy, her name).  Walks on her own but some balance problems. Only will eat with a spoon (fork rarely). Menstrual pain treated with tylenol. Melatonin causes her to be combative. No focal weakness. Will go the restroom on her own at home but diapers away from home. No yelling or behavioral issues. Poor sight.       EXAM:   - Vitals: /80   Pulse 80   Ht 5' 2" (1.575 m)   BMI 39.64 kg/m²    Awake, eye contact, interactive, frequently reaches out to hold my hand, plays with exam gloves, sporadic words (her name, daddy, byby).  Antigravity x4.  Left eye exotropia.  No increased work of breathing.  Abdomen soft.  No commands but mimics.  Several single words, no phrases.  Frequently laughing.  Walks with good balance.      PLAN:  31-year-old woman with moderate-severe autism and longstanding epilepsy with a history of status epilepticus terminated with vigabatrin.  Increased lacosamide to 200 mg twice daily, continue vigabatrin 1500 mg twice daily, continue clobazam 20 mg twice daily.  Lab/levels today. Follow up in about 1 year (around 3/28/2023).     Patient Instructions   Ms Deanna La) came to [facility] because of her autism and seizure disorder.  Her larger seizures are well controlled on vimpat, vigabatrin, and clobazam but she is still having frequent small staring spells. I would like her to increase the vimpat to 200mg twice daily starting now. She should not miss any doses of her medication. If she does miss a dose, take it as soon as you remember even if that means doubling up on the dose. Please get labs test to check out the blood levels of medications on your way out. I recommend the COVID and flu vaccines for all my patients with epilepsy. For more information, check out these websites: https://www.epilepsyallianceamerica.org/, www.tito-epilepsy.org, " www.womenandepilepsy.org.      Please avoid dangerous situations.  If she has a single breakthrough seizure, please patient portal me or call the office and we will decide the next steps. If you have multiple seizures in a row and do not return back to baseline, 911 needs to be called.     Return to clinic in 12 months or sooner with issues.  Please patient portal with any questions or concerns.    Joan Velasquez MD PhD  Neurology-Epilepsy  [facility].        Problem List Items Addressed This Visit     Seizure disorder - Primary    Relevant Medications    cloBAZam (ONFI) 20 mg Tab    lacosamide (VIMPAT) 200 mg Tab tablet    Autism    Gait instability    Class 2 obesity due to excess calories without serious comorbidity with body mass index (BMI) of 39.0 to 39.9 in adult    Global developmental delay          More than 50% of the 34 minutes spent with the patient (as well as family/caregiver(s) was spent on face-to-face counseling. Total time spent on encounter:  54 minutes    Disclaimer: This note has been generated using voice-recognition software. There may be typographical errors that were missed during proof-reading.     LABS:  Recent Labs   Lab 06/09/20  1233 12/19/20  1400 03/28/22  1438   WBC  --  6.27 4.69   Hemoglobin  --  12.7 12.4   Hematocrit  --  40.1 37.4   Platelets  --  366 H 370   Sodium 141 142 141   Potassium 3.6 3.5 4.0   BUN 8 7 10   Creatinine 0.59 0.7 0.7   eGFR if African American >60.0 >60.0 >60.0   eGFR if non African American >60.0 >60.0 >60.0   TSH 0.913  --   --        Recent Labs   Lab 06/09/20  1232 12/19/20  1400   Vigabatrin  --  14.0   Lacosamide 2.7 2.0        IMAGING:  Recent imaging is personally reviewed with the patient.    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        Electronically signed by: RAJESH DANIELS MD  Date:     12/17/13  Time:    08:40    Results for orders placed during the hospital encounter of 12/07/13    CT Head Without Contrast    Impression  1. Prominent lateral ventricles with a slightly abnormal configuration possibly a congenital abnormality.    2.  Nonspecific area of hypoattenuation within the right cerebellar hemisphere, may represent averaging from the peduncle, noting edema/infarction are considerations. Comparison with any prior studies would be helpful.  Clinical correlation advised. MRI  could be obtained for further evaluation as warranted.    3.  Sinus disease as above.  ______________________________________    Electronically signed by resident: Mary Gee  Date:     12/08/13  Time:    02:47          As the supervising and teaching physician, I personally reviewed the images and resident's interpretation and I agree with the findings.        Electronically signed by: MORGAN FLORES MD  Date:     12/08/13  Time:    03:07    PAST MEDICAL HISTORY:   Active Ambulatory Problems     Diagnosis Date Noted    Hypokalemia 12/09/2013    Electrolyte abnormality 12/09/2013    Hypernatremia 12/14/2013    Upper GI bleed (noted on 12/15/13) 12/15/2013    Seizure disorder 12/27/2013    Autism 12/27/2013    Candida UTI 12/27/2013    Gait instability 01/20/2014    Dysmetria 01/20/2014    UTI (lower urinary tract infection) 02/03/2014    High risk medication use - Both Eyes 03/18/2014    Dental caries 04/30/2021    Nonspecific abnormal electrocardiogram (ECG) (EKG) 04/30/2021    SOBOE (shortness of breath on exertion) 04/30/2021    Class 2 obesity due to excess calories without serious comorbidity with body mass index (BMI) of 39.0  to 39.9 in adult 04/30/2021    Preop cardiovascular exam 04/30/2021    Global developmental delay 03/28/2022     Resolved Ambulatory Problems     Diagnosis Date Noted    Status epilepticus 12/07/2013    SIRS (systemic inflammatory response syndrome) 12/09/2013    ARF (acute respiratory failure) 12/09/2013    Shock 12/14/2013    Dysphagia, oropharyngeal 12/18/2013    Tracheostomy care 12/27/2013    Aspiration pneumonia 12/27/2013    CHI (closed head injury) 01/20/2014    Closed head injury 01/20/2014    Right hemiparesis 01/20/2014    Dysphagia 01/21/2014     Past Medical History:   Diagnosis Date    Autism disorder     Encounter for blood transfusion     Seizures     Strabismus         PAST SURGICAL HISTORY:   Past Surgical History:   Procedure Laterality Date    EYE SURGERY      STRABISMUS SURGERY          ALLERGIES: Loratadine and Milk containing products   CURRENT MEDICATIONS:   Current Outpatient Medications   Medication Sig Dispense Refill    cloBAZam (ONFI) 20 mg Tab Take 1 tablet (20 mg total) by mouth 2 (two) times daily. 60 tablet 5    lacosamide (VIMPAT) 200 mg Tab tablet Take 1 tablet (200 mg total) by mouth 2 (two) times a day. 60 tablet 5    SABRIL tablet Take 3 tablets (1,500 mg total) by mouth 2 (two) times daily. 540 tablet 3     Current Facility-Administered Medications   Medication Dose Route Frequency Provider Last Rate Last Admin    medroxyPROGESTERone (DEPO-PROVERA) injection 150 mg  150 mg Intramuscular Q90 Days Jesus Haines MD   150 mg at 06/08/18 0937        FAMILY HISTORY:   Family History   Problem Relation Age of Onset    Diabetes Father     Hypertension Father     Cataracts Maternal Aunt     Diabetes Paternal Aunt     Diabetes Paternal Uncle     Diabetes Paternal Grandmother     Hypertension Paternal Grandmother     No Known Problems Mother     No Known Problems Sister     No Known Problems Brother     No Known Problems Maternal Uncle     No Known  Problems Maternal Grandmother     No Known Problems Maternal Grandfather     No Known Problems Paternal Grandfather     Amblyopia Neg Hx     Blindness Neg Hx     Cancer Neg Hx     Glaucoma Neg Hx     Macular degeneration Neg Hx     Retinal detachment Neg Hx     Strabismus Neg Hx     Stroke Neg Hx     Thyroid disease Neg Hx          SOCIAL HISTORY:   Social History     Socioeconomic History    Marital status: Single   Tobacco Use    Smoking status: Never Smoker    Smokeless tobacco: Never Used   Substance and Sexual Activity    Alcohol use: No    Drug use: No    Sexual activity: Never   Social History Narrative    24 yo  Female developmental delayed    Non-verbal              Questions and concerns raised by the patient and family/care-giver(s) were addressed and they indicated understanding of everything discussed and agreed to plans as above.    Joan Velasquez MD PhD  Neurology-Epilepsy  [facility].

## 2022-03-30 LAB — LACOSAMIDE: 2.6 MCG/ML (ref 1–10)

## 2022-03-31 LAB
CLOBAZAM SERPL-MCNC: 412 NG/ML (ref 30–300)
NORCLOBAZAM SERPL-MCNC: 626 NG/ML (ref 300–3000)

## 2022-04-05 ENCOUNTER — TELEPHONE (OUTPATIENT)
Dept: FAMILY MEDICINE | Facility: CLINIC | Age: 32
End: 2022-04-05
Payer: MEDICAID

## 2022-04-05 NOTE — TELEPHONE ENCOUNTER
Pt sent portal request for an appt    Message    Appointment Request From: Deanna Miles      With Provider: Lincoln Cleveland MD [[facility]]      Preferred Date Range: Any date 4/11/2022 or later      Preferred Times: Monday Morning      Reason for visit: Depo shot      Comments:   Need urine preg ordered and order for depo shot     Dr Cleveland - do you prescribe this?

## 2022-04-06 ENCOUNTER — PATIENT MESSAGE (OUTPATIENT)
Dept: FAMILY MEDICINE | Facility: CLINIC | Age: 32
End: 2022-04-06
Payer: MEDICAID

## 2022-04-22 LAB — VIGABATRIN: 10.2 UG/ML

## 2022-05-23 ENCOUNTER — TELEPHONE (OUTPATIENT)
Dept: NEUROLOGY | Facility: CLINIC | Age: 32
End: 2022-05-23
Payer: MEDICAID

## 2022-05-23 DIAGNOSIS — G40.911 INTRACTABLE EPILEPSY WITH STATUS EPILEPTICUS, UNSPECIFIED EPILEPSY TYPE: Primary | ICD-10-CM

## 2022-05-23 NOTE — TELEPHONE ENCOUNTER
----- Message from Virginia Simmons sent at 5/23/2022  4:10 PM CDT -----  Contact: Mandi Jose Martin/ Mother [phone]  Type: Patient Call Back    Who called: Mandi Jose Martin    What is the request in detail: Please call mother to let know if she can start giving patient the full dose of her seizure medication again. Mandi states that she's being told by the mother that they have been cutting her medication in half. Please call mother in regards to this.     Would the patient rather a call back or a response via [facility]? Call back    Best call back number: Mother [phone]

## 2022-05-23 NOTE — TELEPHONE ENCOUNTER
Lacosamide 200 mg twice daily (15 day supply) sent to the [facility] listed below    Joan Velasquez MD PhD  Neurology-Epilepsy  [facility].          ----- Message from Belem Blum sent at 5/23/2022  3:59 PM CDT -----  Type:  Pharmacy Calling to Clarify an RX    Name of Caller: Dona     Pharmacy Name: [facility]     Prescription Name: lacosamide (VIMPAT) 200 mg Tab tablet    What do they need to clarify? Pt need a 15 day supply, mail order Rx has been delayed     Can you be contacted via [facility]?    Best Call Back Number: [phone] (home)       Additional Information:        [id number] - [place] - [address]  [address]  [place] [address]  Phone: [phone] Fax: [phone]

## 2022-05-24 RX ORDER — LACOSAMIDE 200 MG/1
200 TABLET ORAL EVERY 12 HOURS
Qty: 30 TABLET | Refills: 0 | Status: SHIPPED | OUTPATIENT
Start: 2022-05-24 | End: 2022-06-08

## 2022-05-25 NOTE — TELEPHONE ENCOUNTER
Spoke with patient's mother, Viv, and confirmed Deanna is to take Lacosamide 200mg BID. I informed her the short term fill [facility] [address] [facility]. Viv verbalized understanding and confirmed she will administer the 200mg BID. Viv confirmed Deanna has been getting the clobazam 20mg BID and the vigabatrin 1,500mg BID.

## 2022-08-22 ENCOUNTER — TELEPHONE (OUTPATIENT)
Dept: NEUROLOGY | Facility: CLINIC | Age: 32
End: 2022-08-22
Payer: MEDICAID

## 2022-08-22 DIAGNOSIS — G40.909 SEIZURE DISORDER: ICD-10-CM

## 2022-08-22 RX ORDER — VIGABATRIN 500 MG/1
1500 TABLET, FILM COATED ORAL 2 TIMES DAILY
Qty: 540 TABLET | Refills: 3 | Status: SHIPPED | OUTPATIENT
Start: 2022-08-22 | End: 2022-08-24 | Stop reason: SDUPTHER

## 2022-08-22 NOTE — TELEPHONE ENCOUNTER
Sabril 1500 mg twice daily    Joan Velasquez MD PhD  Neurology-Epilepsy  [facility].        ----- Message from Joan Velasquez MD PhD sent at 8/22/2022 12:20 PM CDT -----  Are you able to help with this question?    Either are fine with me but I know insurance gets tricky.     Joan     ----- Message -----  From: Francisco Carpio MA  Sent: 8/22/2022  12:18 PM CDT  To: Ron Franco Staff    Is geretic or brand name still necessary?            SABRIL tablet      [facility]  [place]   Phone:  [phone]  Fax:  [phone]

## 2022-08-24 DIAGNOSIS — G40.909 SEIZURE DISORDER: ICD-10-CM

## 2022-08-24 RX ORDER — VIGABATRIN 500 MG/1
1500 TABLET, FILM COATED ORAL 2 TIMES DAILY
Qty: 180 TABLET | Refills: 11 | Status: SHIPPED | OUTPATIENT
Start: 2022-08-24 | End: 2023-08-04 | Stop reason: SDUPTHER

## 2022-08-24 NOTE — PROGRESS NOTES
Sabril, brand-name only, 1500 mg twice daily    Only 1 month at a time    Joan Velasquez MD PhD  Neurology-Epilepsy  [facility].

## 2022-10-19 DIAGNOSIS — G40.911 INTRACTABLE EPILEPSY WITH STATUS EPILEPTICUS, UNSPECIFIED EPILEPSY TYPE: ICD-10-CM

## 2022-10-19 RX ORDER — CLOBAZAM 20 MG/1
TABLET ORAL
Qty: 60 TABLET | Refills: 5 | Status: SHIPPED | OUTPATIENT
Start: 2022-10-19 | End: 2023-05-16

## 2022-10-20 NOTE — TELEPHONE ENCOUNTER
Clobazam 20 mg twice daily    Joan Velasquez MD PhD  Neurology-Epilepsy  [facility].

## 2022-10-25 ENCOUNTER — TELEPHONE (OUTPATIENT)
Dept: NEUROLOGY | Facility: CLINIC | Age: 32
End: 2022-10-25
Payer: MEDICAID

## 2022-10-25 ENCOUNTER — TELEPHONE (OUTPATIENT)
Dept: NEUROLOGY | Facility: HOSPITAL | Age: 32
End: 2022-10-25
Payer: MEDICAID

## 2022-10-25 DIAGNOSIS — G40.911 INTRACTABLE EPILEPSY WITH STATUS EPILEPTICUS, UNSPECIFIED EPILEPSY TYPE: ICD-10-CM

## 2022-10-25 RX ORDER — LACOSAMIDE 200 MG/1
200 TABLET ORAL 2 TIMES DAILY
Qty: 60 TABLET | Refills: 5 | Status: SHIPPED | OUTPATIENT
Start: 2022-10-25 | End: 2023-01-05

## 2022-10-25 NOTE — TELEPHONE ENCOUNTER
Prescription for lacosamide 200 mg twice daily faxed to [phone].     Joan Velasquez MD PhD  Neurology-Epilepsy  [facility].      ----- Message from Esha Epstein sent at 10/25/2022  3:35 PM CDT -----  Regarding: refill  Contact: pt @[phone]  Rx Refill/Request    Is this a Refill or New Rx:refill    Rx Name and Strength:lacosamide (VIMPAT) 200 mg Tab tablet    Preferred Pharmacy with phone number:  [facility]/pharmacy #[id number] Temp Closure - [place] - [address] [facility]  [address]  [address]  [place] [address]  Phone: [phone] Fax: [phone]    Communication Preference:pt @ [phone]  Additional Information:

## 2022-10-25 NOTE — TELEPHONE ENCOUNTER
Prescription for lacosamide 200 mg twice daily faxed to [phone].      Joan Velasquez MD PhD  Neurology-Epilepsy  [facility].       ----- Message from Lisa Hill sent at 10/25/2022  9:25 AM CDT -----  Regarding: Refill  [facility] calling regarding Refills  (message) for #lacosamide (VIMPAT) 200 mg Tab tablet pt meds was not deliver and sent back to sender due to address pt is out of meds @ [phone] ext [phone]

## 2023-01-05 DIAGNOSIS — G40.911 INTRACTABLE EPILEPSY WITH STATUS EPILEPTICUS, UNSPECIFIED EPILEPSY TYPE: ICD-10-CM

## 2023-01-05 RX ORDER — LACOSAMIDE 200 MG/1
TABLET ORAL
Qty: 180 TABLET | Refills: 1 | Status: SHIPPED | OUTPATIENT
Start: 2023-01-05 | End: 2023-08-26

## 2023-01-05 NOTE — TELEPHONE ENCOUNTER
Lacosamide 200 mg twice daily    Joan Velasquez MD PhD  Neurology-Epilepsy  [facility].

## 2023-02-01 ENCOUNTER — TELEPHONE (OUTPATIENT)
Dept: NEUROLOGY | Facility: CLINIC | Age: 33
End: 2023-02-01
Payer: MEDICAID

## 2023-02-07 DIAGNOSIS — Z00.00 ENCOUNTER FOR MEDICARE ANNUAL WELLNESS EXAM: ICD-10-CM

## 2023-02-09 DIAGNOSIS — Z00.00 ENCOUNTER FOR MEDICARE ANNUAL WELLNESS EXAM: ICD-10-CM

## 2023-05-16 DIAGNOSIS — G40.911 INTRACTABLE EPILEPSY WITH STATUS EPILEPTICUS, UNSPECIFIED EPILEPSY TYPE: ICD-10-CM

## 2023-05-16 RX ORDER — CLOBAZAM 20 MG/1
20 TABLET ORAL 2 TIMES DAILY
Qty: 60 TABLET | Refills: 5 | Status: SHIPPED | OUTPATIENT
Start: 2023-05-16 | End: 2023-11-10 | Stop reason: SDUPTHER

## 2023-05-17 NOTE — TELEPHONE ENCOUNTER
Clobazam 20 mg twice daily    [facility]  Neurology-Epilepsy  [facility].

## 2023-07-19 ENCOUNTER — PATIENT MESSAGE (OUTPATIENT)
Dept: RESEARCH | Facility: HOSPITAL | Age: 33
End: 2023-07-19
Payer: MEDICARE

## 2023-08-04 DIAGNOSIS — G40.909 SEIZURE DISORDER: ICD-10-CM

## 2023-08-04 RX ORDER — VIGABATRIN 500 MG/1
1500 TABLET, FILM COATED ORAL 2 TIMES DAILY
Qty: 180 TABLET | Refills: 11 | Status: SHIPPED | OUTPATIENT
Start: 2023-08-04 | End: 2023-08-11 | Stop reason: SDUPTHER

## 2023-08-04 NOTE — TELEPHONE ENCOUNTER
Brand-name Sabril 1500 mg twice daily    [facility]  Neurology-Epilepsy  [facility].

## 2023-08-11 DIAGNOSIS — G40.909 SEIZURE DISORDER: ICD-10-CM

## 2023-08-11 RX ORDER — VIGABATRIN 500 MG/1
1500 TABLET, FILM COATED ORAL 2 TIMES DAILY
Qty: 180 TABLET | Refills: 5 | Status: SHIPPED | OUTPATIENT
Start: 2023-08-11 | End: 2023-08-29

## 2023-08-11 NOTE — TELEPHONE ENCOUNTER
Sabril brand-name only 1500 mg twice daily    [facility] MD [facility]  Neurology-Epilepsy  [facility].

## 2023-08-22 ENCOUNTER — TELEPHONE (OUTPATIENT)
Dept: FAMILY MEDICINE | Facility: CLINIC | Age: 33
End: 2023-08-22
Payer: MEDICARE

## 2023-08-22 NOTE — TELEPHONE ENCOUNTER
----- Message from Milla Farfan sent at 8/22/2023  3:30 PM CDT -----  Type:  Needs Medical Advice    Who Called: pt     Would the patient rather a call back or a response via [facility]? call  Best Call Back Number: [phone]  Additional Information: pt is requesting to be seen for to get a annual done and the pt needs to get a 90L form completed and the pt would like to be seen for swelling of both feet

## 2023-08-25 DIAGNOSIS — G40.911 INTRACTABLE EPILEPSY WITH STATUS EPILEPTICUS, UNSPECIFIED EPILEPSY TYPE: ICD-10-CM

## 2023-08-26 RX ORDER — LACOSAMIDE 200 MG/1
200 TABLET ORAL EVERY 12 HOURS
Qty: 180 TABLET | Refills: 3 | Status: SHIPPED | OUTPATIENT
Start: 2023-08-26 | End: 2023-11-10 | Stop reason: SDUPTHER

## 2023-08-27 NOTE — TELEPHONE ENCOUNTER
Lacosamide 200 mg twice daily    Jo[facility] MD [facility]  Neurology-Epilepsy  [facility].

## 2023-08-29 ENCOUNTER — OFFICE VISIT (OUTPATIENT)
Dept: FAMILY MEDICINE | Facility: CLINIC | Age: 33
End: 2023-08-29
Payer: MEDICARE

## 2023-08-29 VITALS
DIASTOLIC BLOOD PRESSURE: 80 MMHG | SYSTOLIC BLOOD PRESSURE: 110 MMHG | HEIGHT: 62 IN | TEMPERATURE: 100 F | BODY MASS INDEX: 37.51 KG/M2 | OXYGEN SATURATION: 98 % | HEART RATE: 105 BPM | WEIGHT: 203.81 LBS

## 2023-08-29 DIAGNOSIS — G40.911 INTRACTABLE EPILEPSY WITH STATUS EPILEPTICUS, UNSPECIFIED EPILEPSY TYPE: ICD-10-CM

## 2023-08-29 DIAGNOSIS — F13.20 SEDATIVE, HYPNOTIC OR ANXIOLYTIC DEPENDENCE, UNCOMPLICATED: Primary | ICD-10-CM

## 2023-08-29 DIAGNOSIS — F84.0 AUTISM: ICD-10-CM

## 2023-08-29 DIAGNOSIS — E66.01 MORBID (SEVERE) OBESITY DUE TO EXCESS CALORIES: ICD-10-CM

## 2023-08-29 DIAGNOSIS — G40.909 SEIZURE DISORDER: ICD-10-CM

## 2023-08-29 PROCEDURE — 3008F BODY MASS INDEX DOCD: CPT | Mod: CPTII,S$GLB,, | Performed by: FAMILY MEDICINE

## 2023-08-29 PROCEDURE — 99214 PR OFFICE/OUTPT VISIT, EST, LEVL IV, 30-39 MIN: ICD-10-PCS | Mod: S$GLB,,, | Performed by: FAMILY MEDICINE

## 2023-08-29 PROCEDURE — 3079F DIAST BP 80-89 MM HG: CPT | Mod: CPTII,S$GLB,, | Performed by: FAMILY MEDICINE

## 2023-08-29 PROCEDURE — 1160F RVW MEDS BY RX/DR IN RCRD: CPT | Mod: CPTII,S$GLB,, | Performed by: FAMILY MEDICINE

## 2023-08-29 PROCEDURE — 99214 OFFICE O/P EST MOD 30 MIN: CPT | Mod: S$GLB,,, | Performed by: FAMILY MEDICINE

## 2023-08-29 PROCEDURE — 3079F PR MOST RECENT DIASTOLIC BLOOD PRESSURE 80-89 MM HG: ICD-10-PCS | Mod: CPTII,S$GLB,, | Performed by: FAMILY MEDICINE

## 2023-08-29 PROCEDURE — 1159F MED LIST DOCD IN RCRD: CPT | Mod: CPTII,S$GLB,, | Performed by: FAMILY MEDICINE

## 2023-08-29 PROCEDURE — 3074F SYST BP LT 130 MM HG: CPT | Mod: CPTII,S$GLB,, | Performed by: FAMILY MEDICINE

## 2023-08-29 PROCEDURE — 1160F PR REVIEW ALL MEDS BY PRESCRIBER/CLIN PHARMACIST DOCUMENTED: ICD-10-PCS | Mod: CPTII,S$GLB,, | Performed by: FAMILY MEDICINE

## 2023-08-29 PROCEDURE — 3074F PR MOST RECENT SYSTOLIC BLOOD PRESSURE < 130 MM HG: ICD-10-PCS | Mod: CPTII,S$GLB,, | Performed by: FAMILY MEDICINE

## 2023-08-29 PROCEDURE — 3008F PR BODY MASS INDEX (BMI) DOCUMENTED: ICD-10-PCS | Mod: CPTII,S$GLB,, | Performed by: FAMILY MEDICINE

## 2023-08-29 PROCEDURE — 1159F PR MEDICATION LIST DOCUMENTED IN MEDICAL RECORD: ICD-10-PCS | Mod: CPTII,S$GLB,, | Performed by: FAMILY MEDICINE

## 2023-08-29 RX ORDER — VIGABATRIN 500 MG/1
1500 TABLET ORAL 2 TIMES DAILY
Qty: 180 TABLET | Refills: 5 | Status: SHIPPED | OUTPATIENT
Start: 2023-08-29 | End: 2023-11-10 | Stop reason: SDUPTHER

## 2023-08-29 RX ORDER — TERBINAFINE HYDROCHLORIDE 250 MG/1
TABLET ORAL
Qty: 30 TABLET | Refills: 0 | Status: SHIPPED | OUTPATIENT
Start: 2023-08-29 | End: 2023-08-29 | Stop reason: SDUPTHER

## 2023-08-29 NOTE — TELEPHONE ENCOUNTER
Vigabatrin 1500 twice daily    Jo[facility] MD [facility]  Neurology-Epilepsy  [facility].

## 2023-09-04 RX ORDER — TERBINAFINE HYDROCHLORIDE 250 MG/1
TABLET ORAL
Qty: 30 TABLET | Refills: 0 | Status: SHIPPED | OUTPATIENT
Start: 2023-09-04 | End: 2023-11-10

## 2023-09-04 NOTE — PROGRESS NOTES
" Patient ID: Deanna Miles is a 33 y.o. female.    Chief Complaint: 90L form, Joint Swelling, sleeping alot, and Nail Problem    HPI      Deanna Miles is a 33 y.o. female here following up on chronic medical conditions including autism-seizure disorder obesity.  Continues to go to school without new problems.  No recent seizures.    Complains of toe fungus returns.  Lamisil helped would like to start medication again for onychomycosis.    Vitals:    08/29/23 1445   BP: 110/80   BP Location: Right arm   Patient Position: Sitting   Pulse: 105   Temp: 99.5 °F (37.5 °C)   TempSrc: Oral   SpO2: 98%   Weight: 92.4 kg (203 lb 13 oz)   Height: 5' 2" (1.575 m)            Review of Symptoms      Physical Exam    Constitutional:  Oriented to person, place, and time.appears well-developed and well-nourished.  No distress.      HENT  Head: Normocephalic and atraumatic  Right Ear: External ear normal.   Left Ear: External ear normal.   Nose: External nose normal.   Mouth:  Moist mucus membranes.    Eyes:  Conjunctivae are normal. Right eye exhibits no discharge.  Left eye exhibits no discharge. No scleral icterus.  No periorbital edema    Cardiovascular:  Regular rate and rhythm with normal S1 and S2     Pulmonary/Chest:   Clear to auscultation bilaterally without wheezes, rhonchi or rales      Musculoskeletal:  No edema. No obvious deformity No wasting       Neurological:  Alert and oriented to person, place, and time.   Coordination normal.     Skin:   Skin is warm and dry.  No diaphoresis.   No rash noted.     Psychiatric: Normal mood and affect. Behavior is normal.  Judgment and thought content normal.     Complete Blood Count  Lab Results   Component Value Date    RBC 4.14 03/28/2022    HGB 12.4 03/28/2022    HCT 37.4 03/28/2022    MCV 90 03/28/2022    MCH 30.0 03/28/2022    MCHC 33.2 03/28/2022    RDW 11.9 03/28/2022     03/28/2022    MPV 9.8 03/28/2022    GRAN 4.4 12/19/2020    GRAN 70.1 12/19/2020    LYMPH " "1.3 12/19/2020    LYMPH 21.1 12/19/2020    MONO 0.5 12/19/2020    MONO 8.1 12/19/2020    EOS 0.0 12/19/2020    BASO 0.02 12/19/2020    EOSINOPHIL 0.2 12/19/2020    BASOPHIL 0.3 12/19/2020    DIFFMETHOD Automated 12/19/2020       Comprehensive Metabolic Panel  No results found for: "GLU", "BUN", "CREATININE", "NA", "K", "CL", "PROT", "ALBUMIN", "BILITOT", "AST", "ALKPHOS", "CO2", "ALT", "ANIONGAP", "EGFRNONAA", "ESTGFRAFRICA"    TSH  No results found for: "TSH"    Assessment / Plan:      ICD-10-CM ICD-9-CM   1. Sedative, hypnotic or anxiolytic dependence, uncomplicated  F13.20 304.10   2. Intractable epilepsy with status epilepticus, unspecified epilepsy type  G40.911 345.91   3. Morbid (severe) obesity due to excess calories  E66.01 278.01   4. Autism  F84.0 299.00     Sedative, hypnotic or anxiolytic dependence, uncomplicated    Intractable epilepsy with status epilepticus, unspecified epilepsy type    Morbid (severe) obesity due to excess calories    Autism    Other orders  -     Discontinue: terbinafine HCL (LAMISIL) 250 mg tablet; Two po the first 5 days of the month for 3 mo  Dispense: 30 tablet; Refill: 0  -     terbinafine HCL (LAMISIL) 250 mg tablet; Two po the first 5 days of the month for 3 mo  Dispense: 30 tablet; Refill: 0      "

## 2023-10-09 ENCOUNTER — PATIENT OUTREACH (OUTPATIENT)
Dept: ADMINISTRATIVE | Facility: HOSPITAL | Age: 33
End: 2023-10-09
Payer: MEDICARE

## 2023-10-09 NOTE — PROGRESS NOTES
Jose Martin chart review/outreach to verify PCP/schedule appt. Pt saw Dr. Dalton on 08/29/2023 and has next annual visit scheduled for 2024.

## 2023-10-13 NOTE — TELEPHONE ENCOUNTER
Rehabilitation Referral Consultation:    Rehabilitation referral consultation completed:  Yes     OP PT recommended by [facility]. Patient is s/p left patellar tendon repair. Patient scheduled to see James NP/Ortho on 10/24/23. Patient will need to wait until ortho follow and orders for OP PT provided.    Jacqueline Vanessa OT  Outpatient Therapy Referral Coordinator  [facility]  [phone] (phone)  [phone] (pager)       Spoke to pharmacy and refilled Vimpat 200mg BID with 5 refills per Dr Shi.

## 2023-10-30 ENCOUNTER — HOSPITAL ENCOUNTER (EMERGENCY)
Facility: HOSPITAL | Age: 33
Discharge: HOME | End: 2023-10-31
Attending: EMERGENCY MEDICINE
Payer: MEDICARE

## 2023-10-30 DIAGNOSIS — R56.9 SEIZURES: Primary | ICD-10-CM

## 2023-10-30 LAB
ALBUMIN SERPL BCP-MCNC: 4 G/DL (ref 3.5–5.2)
ALP SERPL-CCNC: 59 U/L (ref 55–135)
ALT SERPL W/O P-5'-P-CCNC: 7 U/L (ref 10–44)
ANION GAP SERPL CALC-SCNC: 11 MMOL/L (ref 8–16)
AST SERPL-CCNC: 14 U/L (ref 10–40)
BASOPHILS # BLD AUTO: 0.02 K/UL (ref 0–0.2)
BASOPHILS NFR BLD: 0.2 % (ref 0–1.9)
BILIRUB SERPL-MCNC: 0.2 MG/DL (ref 0.1–1)
BUN SERPL-MCNC: 9 MG/DL (ref 6–20)
CALCIUM SERPL-MCNC: 8.9 MG/DL (ref 8.7–10.5)
CHLORIDE SERPL-SCNC: 109 MMOL/L (ref 95–110)
CO2 SERPL-SCNC: 20 MMOL/L (ref 23–29)
CREAT SERPL-MCNC: 0.7 MG/DL (ref 0.5–1.4)
DIFFERENTIAL METHOD: ABNORMAL
EOSINOPHIL # BLD AUTO: 0 K/UL (ref 0–0.5)
EOSINOPHIL NFR BLD: 0.1 % (ref 0–8)
ERYTHROCYTE [DISTWIDTH] IN BLOOD BY AUTOMATED COUNT: 12 % (ref 11.5–14.5)
EST. GFR  (NO RACE VARIABLE): >60 ML/MIN/1.73 M^2
GLUCOSE SERPL-MCNC: 94 MG/DL (ref 70–110)
HCT VFR BLD AUTO: 38 % (ref 37–48.5)
HGB BLD-MCNC: 12.9 G/DL (ref 12–16)
IMM GRANULOCYTES # BLD AUTO: 0.02 K/UL (ref 0–0.04)
IMM GRANULOCYTES NFR BLD AUTO: 0.2 % (ref 0–0.5)
LYMPHOCYTES # BLD AUTO: 1.1 K/UL (ref 1–4.8)
LYMPHOCYTES NFR BLD: 13.6 % (ref 18–48)
MCH RBC QN AUTO: 30.9 PG (ref 27–31)
MCHC RBC AUTO-ENTMCNC: 33.9 G/DL (ref 32–36)
MCV RBC AUTO: 91 FL (ref 82–98)
MONOCYTES # BLD AUTO: 0.4 K/UL (ref 0.3–1)
MONOCYTES NFR BLD: 5.3 % (ref 4–15)
NEUTROPHILS # BLD AUTO: 6.6 K/UL (ref 1.8–7.7)
NEUTROPHILS NFR BLD: 80.6 % (ref 38–73)
NRBC BLD-RTO: 0 /100 WBC
PLATELET # BLD AUTO: 416 K/UL (ref 150–450)
PMV BLD AUTO: 9.9 FL (ref 9.2–12.9)
POTASSIUM SERPL-SCNC: 3.9 MMOL/L (ref 3.5–5.1)
PROT SERPL-MCNC: 7.5 G/DL (ref 6–8.4)
RBC # BLD AUTO: 4.18 M/UL (ref 4–5.4)
SODIUM SERPL-SCNC: 140 MMOL/L (ref 136–145)
WBC # BLD AUTO: 8.25 K/UL (ref 3.9–12.7)

## 2023-10-30 PROCEDURE — 85025 COMPLETE CBC W/AUTO DIFF WBC: CPT | Mod: HCNC | Performed by: EMERGENCY MEDICINE

## 2023-10-30 PROCEDURE — 81025 URINE PREGNANCY TEST: CPT | Mod: HCNC | Performed by: EMERGENCY MEDICINE

## 2023-10-30 PROCEDURE — 81003 URINALYSIS AUTO W/O SCOPE: CPT | Mod: HCNC | Performed by: EMERGENCY MEDICINE

## 2023-10-30 PROCEDURE — 99283 EMERGENCY DEPT VISIT LOW MDM: CPT | Mod: HCNC

## 2023-10-30 PROCEDURE — 80053 COMPREHEN METABOLIC PANEL: CPT | Mod: HCNC | Performed by: EMERGENCY MEDICINE

## 2023-10-31 VITALS
TEMPERATURE: 98 F | OXYGEN SATURATION: 97 % | HEART RATE: 77 BPM | SYSTOLIC BLOOD PRESSURE: 132 MMHG | RESPIRATION RATE: 21 BRPM | DIASTOLIC BLOOD PRESSURE: 85 MMHG

## 2023-10-31 LAB
B-HCG UR QL: NEGATIVE
BILIRUB UR QL STRIP: NEGATIVE
CLARITY UR REFRACT.AUTO: ABNORMAL
COLOR UR AUTO: YELLOW
CTP QC/QA: YES
GLUCOSE UR QL STRIP: NEGATIVE
HGB UR QL STRIP: ABNORMAL
KETONES UR QL STRIP: NEGATIVE
LEUKOCYTE ESTERASE UR QL STRIP: NEGATIVE
NITRITE UR QL STRIP: NEGATIVE
PH UR STRIP: 6 [PH] (ref 5–8)
PROT UR QL STRIP: NEGATIVE
SP GR UR STRIP: 1.01 (ref 1–1.03)
URN SPEC COLLECT METH UR: ABNORMAL

## 2023-10-31 NOTE — FIRST PROVIDER EVALUATION
Medical screening examination initiated.  I have conducted a focused provider triage encounter, findings are as follows:    Brief history of present illness:  witnessed sz at home, no trauma, compliant on home meds vimpat and onfi and vigabatrin    Vitals:    10/30/23 2119   BP: (!) 146/75   Pulse: 110   Resp: 16   SpO2: 96%       Pertinent physical exam:  nonverbal, at baseline    Brief workup plan:  cbc, cmp, ua, poc preg    Preliminary workup initiated; this workup will be continued and followed by the physician or advanced practice provider that is assigned to the patient when roomed.

## 2023-10-31 NOTE — ED PROVIDER NOTES
Encounter Date: 10/30/2023       History     Chief Complaint   Patient presents with    Seizures     Witnessed seizure by family, lasted 2 minutes per family. EMS reports that patient is at baseline per family. Patient hx autism, nonverbal at baseline.      Patient is a 33 year old female with past medical history of autism, and seizure disorder that presents to the ED after 10 to 15 minute grand mal seizure about 1 hour ago at home.  Patient does have known seizure disorder and follows up closely with her epileptic doctor.  Mother states that she has not had any large seizures that required hospitalization for about 7 or 8 years.  States that she does occasionally get focal seizures where she stares off into space and has some arm twitching but does often self resolve.  She takes all her medications as indicated and every day.  Mother denies any history of cough, dysuria, foul-smelling urine.  Patient is able to say and point to things when they hurt however is not doing so at this time.  Per mother and father who were at bedside state that the patient is at her current baseline.    No other complaints at this time.        Review of patient's allergies indicates:   Allergen Reactions    Loratadine Diarrhea     All allergy medicines    Milk containing products (dairy) Diarrhea     Past Medical History:   Diagnosis Date    Autism disorder     Encounter for blood transfusion     Seizures     Strabismus      Past Surgical History:   Procedure Laterality Date    EYE SURGERY      STRABISMUS SURGERY       Family History   Problem Relation Age of Onset    Diabetes Father     Hypertension Father     Cataracts Maternal Aunt     Diabetes Paternal Aunt     Diabetes Paternal Uncle     Diabetes Paternal Grandmother     Hypertension Paternal Grandmother     No Known Problems Mother     No Known Problems Sister     No Known Problems Brother     No Known Problems Maternal Uncle     No Known Problems Maternal Grandmother     No  Known Problems Maternal Grandfather     No Known Problems Paternal Grandfather     Amblyopia Neg Hx     Blindness Neg Hx     Cancer Neg Hx     Glaucoma Neg Hx     Macular degeneration Neg Hx     Retinal detachment Neg Hx     Strabismus Neg Hx     Stroke Neg Hx     Thyroid disease Neg Hx      Social History     Tobacco Use    Smoking status: Never     Passive exposure: Never    Smokeless tobacco: Never   Substance Use Topics    Alcohol use: No    Drug use: No     Review of Systems  ROS unable to obtain secondary to clinical    Physical Exam     Initial Vitals   BP Pulse Resp Temp SpO2   10/30/23 2119 10/30/23 2119 10/30/23 2119 10/31/23 0015 10/30/23 2119   (!) 146/75 110 16 97.8 °F (36.6 °C) 96 %      MAP       --                Physical Exam    Constitutional: She appears well-developed and well-nourished. She is Obese . She is active.   HENT:   Head: Normocephalic.   Right Ear: External ear normal.   Left Ear: External ear normal.   Mouth/Throat: Oropharynx is clear and moist.   Eyes: EOM are normal. Pupils are equal, round, and reactive to light. Right eye exhibits no discharge. Left eye exhibits no discharge. No scleral icterus.   Neck: Neck supple. No JVD present.   Cardiovascular:  Normal rate, regular rhythm, normal heart sounds and intact distal pulses.           Pulmonary/Chest: Breath sounds normal. She has no wheezes. She has no rhonchi. She has no rales.   Abdominal: Abdomen is soft. She exhibits no distension. There is no abdominal tenderness.   Musculoskeletal:         General: No tenderness. Normal range of motion.      Cervical back: Neck supple.     Neurological: She is alert. She has normal strength.   Skin: Skin is warm. Capillary refill takes less than 2 seconds. No rash and no abscess noted.   Psychiatric: She has a normal mood and affect.   Patient is nonverbal at baseline.  Expresses herself and groans and squeals.  Able to say her name her father's name on her mother's name.  Otherwise does  not give much verbal speak which is her baseline         ED Course   Procedures  Labs Reviewed   CBC W/ AUTO DIFFERENTIAL - Abnormal; Notable for the following components:       Result Value    Gran % 80.6 (*)     Lymph % 13.6 (*)     All other components within normal limits   COMPREHENSIVE METABOLIC PANEL - Abnormal; Notable for the following components:    CO2 20 (*)     ALT 7 (*)     All other components within normal limits   URINALYSIS, REFLEX TO URINE CULTURE - Abnormal; Notable for the following components:    Appearance, UA Hazy (*)     Occult Blood UA Trace (*)     All other components within normal limits    Narrative:     Specimen Source->Urine   POCT URINE PREGNANCY          Imaging Results    None          Medications - No data to display  Medical Decision Making  Patient is a 33 year old female with past medical history of autism, and seizure disorder that presents to the ED after 10 to 15 minute grand mal seizure about 1 hour ago at home.    On initial evaluation patient had normal vitals, protecting her airway, behaving as she normally does with her parents.  And in no acute distress.    Differential: Suspect breakthrough seizure given past medical history of seizure disorder.  Also considering conditions that would lower seizure threshold such as infection or electrolyte abnormality.  Also considering changes in medications however less likely given no reported history in changing his medications or noncompliance.    Workup will include basic labs, and monitoring for further seizure activity.    Workup was not significant for any evidence of infection or electrolyte abnormality.  Mother has good understanding of patient condition in his able to have good follow up with her primary care doctor and neurologist.    Patient stable for discharge at this time.  Mother expressed understanding with the plan to follow up with her neurologist this week up titrate or change medications as needed.    Amount  and/or Complexity of Data Reviewed  Labs: ordered. Decision-making details documented in ED Course.                               Clinical Impression:   Final diagnoses:  [R56.9] Seizures (Primary)        ED Disposition Condition    Discharge Stable          ED Prescriptions    None       Follow-up Information       Follow up With Specialties Details Why Contact Info Additional Information    [facility] 7th Fl Neurology In 1 day  [address], [address]  [facility] [address] [phone] [facility] [place], 7th Floor Please park in [facility] and take Clinic elevator    [facility] Emergency Dept Emergency Medicine  If symptoms worsen [address] [facility] [address] [phone]              Fred Fritz, DO  Resident  10/31/23 0702

## 2023-10-31 NOTE — ED TRIAGE NOTES
Patient was at home where the patient had a witnessed seizure, witnessed by her father. The patient family stated that the patient was seizing for 15-20 minutes. The patient has a history of previous seizures and autism. The patient is nonverbal at baseline. Per the family the patient was not acting her normal self today and was more confused than her baseline.

## 2023-10-31 NOTE — ED NOTES
Assumed care at this time. Pts mother assisted pt to the restroom to obtain urine specimen. The patient is awake, alert and acting baseline appropriate. Pt is autistic. Family at bedside.    No apparent distress noted. Airway is open and patent.  Respirations with normal effort and rate noted. Explanation of care provided to family and patient. No needs at this time. Will continue to monitor.

## 2023-11-01 ENCOUNTER — TELEPHONE (OUTPATIENT)
Dept: ADMINISTRATIVE | Facility: CLINIC | Age: 33
End: 2023-11-01
Payer: MEDICARE

## 2023-11-01 ENCOUNTER — TELEPHONE (OUTPATIENT)
Dept: NEUROLOGY | Facility: CLINIC | Age: 33
End: 2023-11-01
Payer: MEDICARE

## 2023-11-01 NOTE — TELEPHONE ENCOUNTER
----- Message from Radha Barksdale sent at 10/31/2023  2:12 PM CDT -----  Regarding: appt access  Contact: Pravin [phone]  Pt mother Pravin is calling to speak with someone in provider office in regards to a ED visit on 10/30 Pravin stated she was told to f/u with the provider after the ED visit Pravin  is asking for a return call please call her at [phone]

## 2023-11-01 NOTE — TELEPHONE ENCOUNTER
----- Message from Mali Garnett sent at 10/31/2023  9:36 AM CDT -----  Regarding: appt  Contact: [phone]  Caller stated pt had more seizures. Pt needs to be seen ASAP. Attempted to schedule, no access till Nov 13. Pt needs to be seen sooner then that. Pls call to discuss.

## 2023-11-01 NOTE — TELEPHONE ENCOUNTER
Spoke with patient's mother, Viv, and she stated patient is unable to speak.  Viv stated she received call today with patient's appointment details.  Viv expressed no other needs to be addressed at this time.

## 2023-11-10 ENCOUNTER — OFFICE VISIT (OUTPATIENT)
Dept: NEUROLOGY | Facility: CLINIC | Age: 33
End: 2023-11-10
Payer: MEDICARE

## 2023-11-10 ENCOUNTER — LAB VISIT (OUTPATIENT)
Dept: LAB | Facility: HOSPITAL | Age: 33
End: 2023-11-10
Attending: PSYCHIATRY & NEUROLOGY
Payer: MEDICARE

## 2023-11-10 VITALS
DIASTOLIC BLOOD PRESSURE: 92 MMHG | HEIGHT: 62 IN | WEIGHT: 210.56 LBS | HEART RATE: 103 BPM | SYSTOLIC BLOOD PRESSURE: 124 MMHG | BODY MASS INDEX: 38.75 KG/M2

## 2023-11-10 DIAGNOSIS — G40.911 INTRACTABLE EPILEPSY WITH STATUS EPILEPTICUS, UNSPECIFIED EPILEPSY TYPE: ICD-10-CM

## 2023-11-10 DIAGNOSIS — R26.81 GAIT INSTABILITY: ICD-10-CM

## 2023-11-10 DIAGNOSIS — F84.0 AUTISM: ICD-10-CM

## 2023-11-10 DIAGNOSIS — F88 GLOBAL DEVELOPMENTAL DELAY: ICD-10-CM

## 2023-11-10 DIAGNOSIS — E66.09 CLASS 2 OBESITY DUE TO EXCESS CALORIES WITHOUT SERIOUS COMORBIDITY WITH BODY MASS INDEX (BMI) OF 38.0 TO 38.9 IN ADULT: ICD-10-CM

## 2023-11-10 DIAGNOSIS — G40.911 INTRACTABLE EPILEPSY WITH STATUS EPILEPTICUS, UNSPECIFIED EPILEPSY TYPE: Primary | ICD-10-CM

## 2023-11-10 DIAGNOSIS — Z91.89 AT RISK FOR SIDE EFFECT OF MEDICATION: ICD-10-CM

## 2023-11-10 DIAGNOSIS — Z79.899 HIGH RISK MEDICATION USE: ICD-10-CM

## 2023-11-10 DIAGNOSIS — G40.919 BREAKTHROUGH SEIZURE: ICD-10-CM

## 2023-11-10 PROCEDURE — 1159F PR MEDICATION LIST DOCUMENTED IN MEDICAL RECORD: ICD-10-PCS | Mod: HCNC,CPTII,S$GLB, | Performed by: PSYCHIATRY & NEUROLOGY

## 2023-11-10 PROCEDURE — 99215 OFFICE O/P EST HI 40 MIN: CPT | Mod: HCNC,S$GLB,, | Performed by: PSYCHIATRY & NEUROLOGY

## 2023-11-10 PROCEDURE — 99215 PR OFFICE/OUTPT VISIT, EST, LEVL V, 40-54 MIN: ICD-10-PCS | Mod: HCNC,S$GLB,, | Performed by: PSYCHIATRY & NEUROLOGY

## 2023-11-10 PROCEDURE — 80339 ANTIEPILEPTICS NOS 1-3: CPT | Mod: 91,HCNC | Performed by: PSYCHIATRY & NEUROLOGY

## 2023-11-10 PROCEDURE — 3008F PR BODY MASS INDEX (BMI) DOCUMENTED: ICD-10-PCS | Mod: HCNC,CPTII,S$GLB, | Performed by: PSYCHIATRY & NEUROLOGY

## 2023-11-10 PROCEDURE — 3008F BODY MASS INDEX DOCD: CPT | Mod: HCNC,CPTII,S$GLB, | Performed by: PSYCHIATRY & NEUROLOGY

## 2023-11-10 PROCEDURE — 80235 DRUG ASSAY LACOSAMIDE: CPT | Mod: HCNC | Performed by: PSYCHIATRY & NEUROLOGY

## 2023-11-10 PROCEDURE — 99999 PR PBB SHADOW E&M-EST. PATIENT-LVL III: ICD-10-PCS | Mod: PBBFAC,HCNC,, | Performed by: PSYCHIATRY & NEUROLOGY

## 2023-11-10 PROCEDURE — 3080F PR MOST RECENT DIASTOLIC BLOOD PRESSURE >= 90 MM HG: ICD-10-PCS | Mod: HCNC,CPTII,S$GLB, | Performed by: PSYCHIATRY & NEUROLOGY

## 2023-11-10 PROCEDURE — 3080F DIAST BP >= 90 MM HG: CPT | Mod: HCNC,CPTII,S$GLB, | Performed by: PSYCHIATRY & NEUROLOGY

## 2023-11-10 PROCEDURE — 3074F PR MOST RECENT SYSTOLIC BLOOD PRESSURE < 130 MM HG: ICD-10-PCS | Mod: HCNC,CPTII,S$GLB, | Performed by: PSYCHIATRY & NEUROLOGY

## 2023-11-10 PROCEDURE — 99999 PR PBB SHADOW E&M-EST. PATIENT-LVL III: CPT | Mod: PBBFAC,HCNC,, | Performed by: PSYCHIATRY & NEUROLOGY

## 2023-11-10 PROCEDURE — 1160F RVW MEDS BY RX/DR IN RCRD: CPT | Mod: HCNC,CPTII,S$GLB, | Performed by: PSYCHIATRY & NEUROLOGY

## 2023-11-10 PROCEDURE — 3074F SYST BP LT 130 MM HG: CPT | Mod: HCNC,CPTII,S$GLB, | Performed by: PSYCHIATRY & NEUROLOGY

## 2023-11-10 PROCEDURE — 80339 ANTIEPILEPTICS NOS 1-3: CPT | Mod: HCNC | Performed by: PSYCHIATRY & NEUROLOGY

## 2023-11-10 PROCEDURE — 1160F PR REVIEW ALL MEDS BY PRESCRIBER/CLIN PHARMACIST DOCUMENTED: ICD-10-PCS | Mod: HCNC,CPTII,S$GLB, | Performed by: PSYCHIATRY & NEUROLOGY

## 2023-11-10 PROCEDURE — 36415 COLL VENOUS BLD VENIPUNCTURE: CPT | Mod: HCNC | Performed by: PSYCHIATRY & NEUROLOGY

## 2023-11-10 PROCEDURE — 1159F MED LIST DOCD IN RCRD: CPT | Mod: HCNC,CPTII,S$GLB, | Performed by: PSYCHIATRY & NEUROLOGY

## 2023-11-10 RX ORDER — CLOBAZAM 20 MG/1
20 TABLET ORAL 2 TIMES DAILY
Qty: 60 TABLET | Refills: 5 | Status: SHIPPED | OUTPATIENT
Start: 2023-11-10

## 2023-11-10 RX ORDER — VIGABATRIN 500 MG/1
1500 TABLET ORAL 2 TIMES DAILY
Qty: 180 TABLET | Refills: 5 | Status: SHIPPED | OUTPATIENT
Start: 2023-11-10

## 2023-11-10 RX ORDER — LACOSAMIDE 200 MG/1
200 TABLET ORAL EVERY 12 HOURS
Qty: 180 TABLET | Refills: 1 | Status: SHIPPED | OUTPATIENT
Start: 2023-11-10

## 2023-11-10 NOTE — PROGRESS NOTES
Name: Deanna Miles  MRN:[id number]   CSN: [id number]  Date of service: 11/10/2023  Age:33 y.o.   Gender:female   Referring Physician/Service: No referring provider defined for this encounter.   The patient is here today with:  Mom, Viv     Neurology Clinic:  Follow-up Visit    CHIEF COMPLAINT:  Autism and epilepsy    Interval Events/ROS 11/10/2023:    Current AED/SEs:  1. Vigabatrin 1500 mg twice daily  SE none   2. Lacosamide 200 mg twice daily  SE none    3. Clobazam 20 mg twice daily  SE none   Breakthrough seizures/events:  Big GTC last week. Sleeping for a couple of days afterwards but has been recoving well. Walked hunched over for several days with a right fist constriction which has resolved.  Still has staring spells daily.  Driving: no  Folic acid: no   Sleep: bed as soon as she eats, 6pm for 2 hours then up, back to bed at 11pm, wake at midnight, only sleeps a couple of hours after every time she eats  Mood: great, no outbursts or refusing    Otherwise, no fever, no cold symptoms, no indications of pain, no obvious changes in vision, no new weakness, no shortness of breath, no vomiting, no diarrhea, no constipation, no problems walking except for walking hunched over several days after a convulsion.    HPI 3/28/2022:     Age of first seizure: 1mo  Seizure Risk Factors:  Autism, paternal cousin with autism and seizures, no head strike with LOC, No CNS infections, term  with no prolonged hospitalization, started seeing something was wrong around 4yo   Time of Last Seizure: last big seizure , last staring spell yesterday    # of lifetime Seizures: staring 1000s, maybe 100+ convulsions    Frequency of Seizures: at most 3 staring spells in 1 day, only 1 bigger event in a day, staring spells approximately three days a week, no bigger events since    Seizure Triggers:  When she gets excited  Injuries/Hospitalization for seizures? Lip bite no other injuries, ED maybe 15 times, admitted most of those  "times   Driving? No   Pregnancy? No   Contraception?  Depo-Provera shot  Folic Acid? No  Bone Health: no dexa      Auras: usually when she is excited but does not always lead to seizures,otherwise no good warning that a seizure is going to happen     Seizure Events:   1. Staring since 2013, 1 minute, tired afterwards, as many as three in a row   2. As a small child - head jerking back and biting on her lip  3. Bigger events - collapse on the floor, arm jerking (2014)    Current AED/SEs:  1. Vigabatrin 1500 mg twice daily SE none   2. Lacosamide 200 mg once daily SE none   3. Clobazam 20 mg twice daily SE none     Previous AED/SEs or reason for DC.   1. Levetiracetam SE seizures through this   2. Phenobarbital SE when younger     EEG: showed bifrontal slowing     MRI: left MTS 2013     Other Allergies:  Dairy, loratadine      AED compliance, adherence: does not refuse medications    ROS 3/28/2022:  Sporadic words (dog, daddy, her name).  Walks on her own but some balance problems. Only will eat with a spoon (fork rarely). Menstrual pain treated with tylenol. Melatonin causes her to be combative. No focal weakness. Will go the restroom on her own at home but diapers away from home. No yelling or behavioral issues. Poor sight.       EXAM:   - Vitals: BP (!) 124/92   Pulse 103   Ht 5' 2" (1.575 m)   Wt 95.5 kg (210 lb 8.6 oz)   BMI 38.51 kg/m²    Awake, eye contact, interactive, frequently reaches out to hold my hand, plays with exam gloves, sporadic words (her name, daddy, byby).  Antigravity x4.  Left eye exotropia.  No increased work of breathing.  Abdomen soft.  No commands but mimics.  Several single words, no phrases.  Frequently laughing.  Walks with good balance.      PLAN:  33-year-old woman with moderate-severe autism and longstanding epilepsy with a history of status epilepticus terminated with vigabatrin.  Continue lacosamide to 200 mg twice daily, continue vigabatrin 1500 mg twice daily, continue " clobazam 20 mg twice daily.  Needs routine vision surveillance while on vigabatrin -> referral to ophthalmology. Lab/levels today. Follow up in about 1 year (around 11/10/2024).     Patient Instructions   Ms Deanna La) came to Epilepsy Clinic because of her autism and seizure disorder.  Her larger seizures are mostly well controlled on vimpat, vigabatrin, and clobazam except for a larger seizure last week. We will not make any medication changes at this time but if she has another seizure we can plan to increase the clobazam at night but we will not do that now.  She has been on vigabatrin for quite some time.  She needs routine vision surveillance while on this medication.  I will refer her over to the Ophthalmology Department for this.  It should be done every 6 months.    Do not miss any doses of medication. If a dose of medication is missed, take it as soon as it is remembered even if that means doubling up on the dose. Please get a lab test to check out the blood level of medication. Get regular sleep. Go to sleep at the same time and wake up at the same time every day. People with epilepsy require more sleep than people without epilepsy.  Sleeping 10-12 hours a day can be normal for a person with epilepsy.  Every seizure makes it harder to prevent the next seizure. Epilepsy is associated with SUDEP, or sudden unexpected death in epilepsy.  The risk is significantly higher if convulsive seizures are not well controlled. For more information, check out these websites: https://www.epilepsy.com/, https://www.epilepsyallianceamerica.org/, www.tito-epilepsy.org, www.womenandepilepsy.org.  If you are interested in meeting other individuals in our epilepsy community, please reach out to the [facility] [place] ([phone], [phone], [email or web address]@[email or web address]).  They organize many informative and fun activities in the region.  They can provide you invaluable information on how to get access to  resources available for patients living with epilepsy as well as a rich community of like-minded individuals who are all learning to cope with the same issues.  It is very important to remember, you are not alone.     Per [place] law, no episodes of loss of consciousness for 6 months before driving.  Avoid dangerous situations.  For example, no baths/pools alone, no heights, no power tools.  Wear a bike helmet.  If breakthrough seizures occur that are different in character, frequency, or duration from normal episodes, please patient portal me or call the office and we will decide the next steps. If multiple seizures occur in a row without return back to baseline, 911 needs to be called.     Return to clinic in 12 months or sooner with issues.  Please patient portal with any questions or concerns.    Joan Velasquez MD PhD [facility]  Neurology-Epilepsy  [facility].    Forms/Letters/Disability/DMV Paperwork: We understand the importance of filling out forms and providing letters in a timely manner.  However, many of these forms have very tricky language and once an official form is submitted as part of the medical record, it can not be modified or erased.  Please work with us in order to get these forms filled out in the most complete, accurate, and efficient way. 1.  Once you are aware that a form will need to be completed, please make an appointment.  A virtual appointment with Dr. Velasquez or Jeannine is perfectly fine. 2.  Please fill out the form as much as you can.  There are many questions that we do not have an answer for.  Please bring a blank copy of the form and your partially filled out form to the clinic visit or send them to us over the portal.  We will complete the form together with you during the clinic visit, sign it, and either return it to you or send it to the correct destination.  Every form will require an appointment however we can fill out multiple forms at once if needed.  Please do  not hesitate to reach out with any questions or concerns about this policy.  We are trying to make sure that we have a system in place to meet this need which works for everyone involved.  Thank you for your understanding.            Problem List Items Addressed This Visit       Breakthrough seizure    Relevant Medications    cloBAZam (ONFI) 20 mg Tab    lacosamide (VIMPAT) 200 mg Tab tablet    vigabatrin (SABRIL) tablet    Autism    Gait instability    High risk medication use - Both Eyes    Relevant Orders    Ambulatory referral/consult to Ophthalmology    Class 2 obesity due to excess calories without serious comorbidity with body mass index (BMI) of 38.0 to 38.9 in adult    Global developmental delay    Intractable epilepsy with status epilepticus - Primary    Relevant Medications    cloBAZam (ONFI) 20 mg Tab    lacosamide (VIMPAT) 200 mg Tab tablet    vigabatrin (SABRIL) tablet    Other Relevant Orders    Clobazam    Lacosamide (Vimpat)    Vigabatrin (Sabril)    Ambulatory referral/consult to Ophthalmology     Other Visit Diagnoses       At risk for side effect of medication        Relevant Orders    Ambulatory referral/consult to Ophthalmology          More than 50% of the 17 minutes spent with the patient (as well as family/caregiver(s) was spent on face-to-face counseling. Total time spent on encounter:  40 minutes    Disclaimer: This note has been generated using voice-recognition software. There may be typographical errors that were missed during proof-reading.     LABS:  Recent Labs   Lab 12/19/20  1400 03/28/22  1438 10/30/23  2245   WBC 6.27 4.69 8.25   Hemoglobin 12.7 12.4 12.9   Hematocrit 40.1 37.4 38.0   Platelets 366 H 370 416   Sodium 142 141 140   Potassium 3.5 4.0 3.9   BUN 7 10 9   Creatinine 0.7 0.7 0.7   eGFR if African American >60.0 >60.0  --    eGFR if non African American >60.0 >60.0  --        Recent Labs   Lab 12/19/20  1400 03/28/22  1438   Vigabatrin 14.0 10.2   Lacosamide 2.0 2.6    Clobazam  --  412.0 H   Desmethylclobazam  --  626.0        IMAGING:  Recent imaging is personally reviewed with the patient.    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        Electronically signed by: RAJESH DANIELS MD  Date:     12/17/13  Time:    08:40    Results for orders placed during the hospital encounter of 12/07/13    CT Head Without Contrast    Impression  1. Prominent lateral ventricles with a slightly abnormal configuration possibly a congenital abnormality.    2.  Nonspecific area of hypoattenuation within the right cerebellar hemisphere, may represent averaging from the peduncle, noting edema/infarction are considerations. Comparison with any prior studies would be helpful.  Clinical correlation advised. MRI  could be obtained for further evaluation as warranted.    3.  Sinus disease as above.  ______________________________________    Electronically signed by resident: Mary Gee  Date:     12/08/13  Time:    02:47          As the supervising and teaching physician, I personally reviewed the images and resident's interpretation and I agree with the findings.        Electronically signed by: MORGAN FLORES MD  Date:     12/08/13  Time:    03:07    PAST MEDICAL HISTORY:   Active Ambulatory Problems     Diagnosis Date Noted    Hypokalemia 12/09/2013    Electrolyte abnormality 12/09/2013    Hypernatremia 12/14/2013    Upper GI bleed (noted on 12/15/13) 12/15/2013    Breakthrough seizure 12/27/2013    Autism 12/27/2013    Candida UTI 12/27/2013    Gait instability 01/20/2014    Dysmetria  01/20/2014    UTI (lower urinary tract infection) 02/03/2014    High risk medication use - Both Eyes 03/18/2014    Dental caries 04/30/2021    Nonspecific abnormal electrocardiogram (ECG) (EKG) 04/30/2021    SOBOE (shortness of breath on exertion) 04/30/2021    Class 2 obesity due to excess calories without serious comorbidity with body mass index (BMI) of 38.0 to 38.9 in adult 04/30/2021    Preop cardiovascular exam 04/30/2021    Global developmental delay 03/28/2022    Sedative, hypnotic or anxiolytic dependence, uncomplicated 08/29/2023    Intractable epilepsy with status epilepticus 11/10/2023     Resolved Ambulatory Problems     Diagnosis Date Noted    Status epilepticus 12/07/2013    SIRS (systemic inflammatory response syndrome) 12/09/2013    ARF (acute respiratory failure) 12/09/2013    Shock 12/14/2013    Dysphagia, oropharyngeal 12/18/2013    Tracheostomy care 12/27/2013    Aspiration pneumonia 12/27/2013    CHI (closed head injury) 01/20/2014    Closed head injury 01/20/2014    Right hemiparesis 01/20/2014    Dysphagia 01/21/2014     Past Medical History:   Diagnosis Date    Autism disorder     Encounter for blood transfusion     Seizures     Strabismus         PAST SURGICAL HISTORY:   Past Surgical History:   Procedure Laterality Date    EYE SURGERY      STRABISMUS SURGERY          ALLERGIES: Loratadine and Milk containing products (dairy)   CURRENT MEDICATIONS:   Current Outpatient Medications   Medication Sig Dispense Refill    cloBAZam (ONFI) 20 mg Tab Take 1 tablet (20 mg total) by mouth 2 (two) times daily. 60 tablet 5    lacosamide (VIMPAT) 200 mg Tab tablet Take 1 tablet (200 mg total) by mouth every 12 (twelve) hours. 180 tablet 1    vigabatrin (SABRIL) tablet Take 3 tablets (1,500 mg total) by mouth 2 (two) times daily. 180 tablet 5     No current facility-administered medications for this visit.        FAMILY HISTORY:   Family History   Problem Relation Age of Onset    Diabetes Father      Hypertension Father     Cataracts Maternal Aunt     Diabetes Paternal Aunt     Diabetes Paternal Uncle     Diabetes Paternal Grandmother     Hypertension Paternal Grandmother     No Known Problems Mother     No Known Problems Sister     No Known Problems Brother     No Known Problems Maternal Uncle     No Known Problems Maternal Grandmother     No Known Problems Maternal Grandfather     No Known Problems Paternal Grandfather     Amblyopia Neg Hx     Blindness Neg Hx     Cancer Neg Hx     Glaucoma Neg Hx     Macular degeneration Neg Hx     Retinal detachment Neg Hx     Strabismus Neg Hx     Stroke Neg Hx     Thyroid disease Neg Hx          SOCIAL HISTORY:   Social History     Socioeconomic History    Marital status: Single   Tobacco Use    Smoking status: Never     Passive exposure: Never    Smokeless tobacco: Never   Substance and Sexual Activity    Alcohol use: No    Drug use: No    Sexual activity: Never   Social History Narrative    22 yo  Female developmental delayed    Non-verbal              Questions and concerns raised by the patient and family/care-giver(s) were addressed and they indicated understanding of everything discussed and agreed to plans as above.    Joan Velasquez MD PhD  Neurology-Epilepsy  [facility].

## 2023-11-10 NOTE — PATIENT INSTRUCTIONS
Ms Huerta (Ivana) came to Epilepsy Clinic because of her autism and seizure disorder.  Her larger seizures are mostly well controlled on vimpat, vigabatrin, and clobazam except for a larger seizure last week. We will not make any medication changes at this time but if she has another seizure we can plan to increase the clobazam at night but we will not do that now.  She has been on vigabatrin for quite some time.  She needs routine vision surveillance while on this medication.  I will refer her over to the Ophthalmology Department for this.  It should be done every 6 months.    Do not miss any doses of medication. If a dose of medication is missed, take it as soon as it is remembered even if that means doubling up on the dose. Please get a lab test to check out the blood level of medication. Get regular sleep. Go to sleep at the same time and wake up at the same time every day. People with epilepsy require more sleep than people without epilepsy.  Sleeping 10-12 hours a day can be normal for a person with epilepsy.  Every seizure makes it harder to prevent the next seizure. Epilepsy is associated with SUDEP, or sudden unexpected death in epilepsy.  The risk is significantly higher if convulsive seizures are not well controlled. For more information, check out these websites: https://www.epilepsy.com/, https://www.epilepsyallianceamerica.org/, www.tito-epilepsy.org, www.womenandepilepsy.org.  If you are interested in meeting other individuals in our epilepsy community, please reach out to the [facility] [place] ([phone], [phone], [email or web address]@[email or web address]).  They organize many informative and fun activities in the region.  They can provide you invaluable information on how to get access to resources available for patients living with epilepsy as well as a rich community of like-minded individuals who are all learning to cope with the same issues.  It is very important to remember, you are not  alone.     Per [place] law, no episodes of loss of consciousness for 6 months before driving.  Avoid dangerous situations.  For example, no baths/pools alone, no heights, no power tools.  Wear a bike helmet.  If breakthrough seizures occur that are different in character, frequency, or duration from normal episodes, please patient portal me or call the office and we will decide the next steps. If multiple seizures occur in a row without return back to baseline, 911 needs to be called.     Return to clinic in 12 months or sooner with issues.  Please patient portal with any questions or concerns.    Joan Velasquez MD PhD [facility]  Neurology-Epilepsy  [facility].    Forms/Letters/Disability/DMV Paperwork: We understand the importance of filling out forms and providing letters in a timely manner.  However, many of these forms have very tricky language and once an official form is submitted as part of the medical record, it can not be modified or erased.  Please work with us in order to get these forms filled out in the most complete, accurate, and efficient way. 1.  Once you are aware that a form will need to be completed, please make an appointment.  A virtual appointment with Dr. Velasquez or Jeannine is perfectly fine. 2.  Please fill out the form as much as you can.  There are many questions that we do not have an answer for.  Please bring a blank copy of the form and your partially filled out form to the clinic visit or send them to us over the portal.  We will complete the form together with you during the clinic visit, sign it, and either return it to you or send it to the correct destination.  Every form will require an appointment however we can fill out multiple forms at once if needed.  Please do not hesitate to reach out with any questions or concerns about this policy.  We are trying to make sure that we have a system in place to meet this need which works for everyone involved.  Thank you for  your understanding.

## 2023-11-13 LAB — LACOSAMIDE: 7.2 MCG/ML (ref 1–10)

## 2023-11-14 LAB
CLOBAZAM SERPL-MCNC: 287 NG/ML (ref 30–300)
NORCLOBAZAM SERPL-MCNC: 471 NG/ML (ref 300–3000)

## 2023-11-20 LAB — VIGABATRIN: 11.2 UG/ML

## 2023-12-01 ENCOUNTER — TELEPHONE (OUTPATIENT)
Dept: ADMINISTRATIVE | Facility: CLINIC | Age: 33
End: 2023-12-01
Payer: MEDICARE

## 2023-12-01 NOTE — TELEPHONE ENCOUNTER
Called pt; no answer; could not confirm appt or leave message due to line kept ringing; I was calling to confirm pt's in office [facility] appt on 12/4/23.

## 2023-12-04 ENCOUNTER — OFFICE VISIT (OUTPATIENT)
Dept: FAMILY MEDICINE | Facility: CLINIC | Age: 33
End: 2023-12-04
Payer: MEDICARE

## 2023-12-04 VITALS
OXYGEN SATURATION: 97 % | HEART RATE: 96 BPM | DIASTOLIC BLOOD PRESSURE: 70 MMHG | HEIGHT: 62 IN | SYSTOLIC BLOOD PRESSURE: 110 MMHG | BODY MASS INDEX: 38.93 KG/M2 | WEIGHT: 211.56 LBS

## 2023-12-04 DIAGNOSIS — R27.8 DYSMETRIA: ICD-10-CM

## 2023-12-04 DIAGNOSIS — Z00.00 ENCOUNTER FOR MEDICARE ANNUAL WELLNESS EXAM: Primary | ICD-10-CM

## 2023-12-04 DIAGNOSIS — F13.20 SEDATIVE, HYPNOTIC OR ANXIOLYTIC DEPENDENCE, UNCOMPLICATED: ICD-10-CM

## 2023-12-04 DIAGNOSIS — Z01.419 WELL WOMAN EXAM: ICD-10-CM

## 2023-12-04 DIAGNOSIS — F88 GLOBAL DEVELOPMENTAL DELAY: ICD-10-CM

## 2023-12-04 DIAGNOSIS — Z00.00 ENCOUNTER FOR PREVENTIVE HEALTH EXAMINATION: ICD-10-CM

## 2023-12-04 DIAGNOSIS — R26.9 ABNORMALITY OF GAIT AND MOBILITY: ICD-10-CM

## 2023-12-04 DIAGNOSIS — G40.911 INTRACTABLE EPILEPSY WITH STATUS EPILEPTICUS, UNSPECIFIED EPILEPSY TYPE: ICD-10-CM

## 2023-12-04 DIAGNOSIS — E66.09 CLASS 2 OBESITY DUE TO EXCESS CALORIES WITHOUT SERIOUS COMORBIDITY WITH BODY MASS INDEX (BMI) OF 38.0 TO 38.9 IN ADULT: ICD-10-CM

## 2023-12-04 DIAGNOSIS — F84.0 AUTISM: ICD-10-CM

## 2023-12-04 PROCEDURE — 99999 PR PBB SHADOW E&M-EST. PATIENT-LVL IV: ICD-10-PCS | Mod: PBBFAC,HCNC,,

## 2023-12-04 PROCEDURE — 3074F PR MOST RECENT SYSTOLIC BLOOD PRESSURE < 130 MM HG: ICD-10-PCS | Mod: HCNC,CPTII,S$GLB,

## 2023-12-04 PROCEDURE — 1160F RVW MEDS BY RX/DR IN RCRD: CPT | Mod: HCNC,CPTII,S$GLB,

## 2023-12-04 PROCEDURE — 1159F MED LIST DOCD IN RCRD: CPT | Mod: HCNC,CPTII,S$GLB,

## 2023-12-04 PROCEDURE — 3078F DIAST BP <80 MM HG: CPT | Mod: HCNC,CPTII,S$GLB,

## 2023-12-04 PROCEDURE — 1160F PR REVIEW ALL MEDS BY PRESCRIBER/CLIN PHARMACIST DOCUMENTED: ICD-10-PCS | Mod: HCNC,CPTII,S$GLB,

## 2023-12-04 PROCEDURE — 1159F PR MEDICATION LIST DOCUMENTED IN MEDICAL RECORD: ICD-10-PCS | Mod: HCNC,CPTII,S$GLB,

## 2023-12-04 PROCEDURE — G0439 PR MEDICARE ANNUAL WELLNESS SUBSEQUENT VISIT: ICD-10-PCS | Mod: HCNC,S$GLB,,

## 2023-12-04 PROCEDURE — G0439 PPPS, SUBSEQ VISIT: HCPCS | Mod: HCNC,S$GLB,,

## 2023-12-04 PROCEDURE — 99999 PR PBB SHADOW E&M-EST. PATIENT-LVL IV: CPT | Mod: PBBFAC,HCNC,,

## 2023-12-04 PROCEDURE — 3074F SYST BP LT 130 MM HG: CPT | Mod: HCNC,CPTII,S$GLB,

## 2023-12-04 PROCEDURE — G9919 PR SCREENING AND POSITIVE: ICD-10-PCS | Mod: HCNC,CPTII,S$GLB,

## 2023-12-04 PROCEDURE — 3078F PR MOST RECENT DIASTOLIC BLOOD PRESSURE < 80 MM HG: ICD-10-PCS | Mod: HCNC,CPTII,S$GLB,

## 2023-12-04 PROCEDURE — G9919 SCRN ND POS ND PROV OF REC: HCPCS | Mod: HCNC,CPTII,S$GLB,

## 2023-12-04 NOTE — PATIENT INSTRUCTIONS
Counseling and Referral of Other Preventative  (Italic type indicates deductible and co-insurance are waived)    Patient Name: Deanna Miles  Today's Date: 12/4/2023    Health Maintenance       Date Due Completion Date    Hepatitis C Screening Never done ---    HIV Screening Never done ---    Influenza Vaccine (1) 06/30/2024 (Originally 9/1/2023) ---    TETANUS VACCINE 12/04/2024 (Originally 9/1/2009) ---    COVID-19 Vaccine (1) 12/04/2024 (Originally [birth year]/1/1992) ---        No orders of the defined types were placed in this encounter.    The following information is provided to all patients.  This information is to help you find resources for any of the problems found today that may be affecting your health:                Living healthy guide: www.[facility].[place].gov      Understanding Diabetes: www.diabetes.org      Eating healthy: www.cdc.gov/healthyweight      CDC home safety checklist: www.cdc.gov/steadi/patient.html      Agency on Aging: www.goea.[place].[facility]      Alcoholics anonymous (AA): www.aa.org      Physical Activity: www.roney.nih.gov/ap1vxxf      Tobacco use: www.quitwithusla.org

## 2023-12-04 NOTE — PROGRESS NOTES
"        Deanna Miles presented for a  Medicare AWV and comprehensive Health Risk Assessment today. Visit complete with the assistance of mother. The following components were reviewed and updated:    Medical history  Family History  Social history  Allergies and Current Medications  Health Risk Assessment  Health Maintenance  Care Team         ** See Completed Assessments for Annual Wellness Visit within the encounter summary.**         The following assessments were completed:  Living Situation  CAGE  Depression Screening  Timed Get Up and Go  Whisper Test  Cognitive Function Screening-unable to complete due to developmental delay and autism.   Nutrition Screening  ADL Screening  PAQ Screening        Vitals:    12/04/23 1355   BP: 110/70   BP Location: Left arm   Patient Position: Sitting   BP Method: Large (Manual)   Pulse: 96   SpO2: 97%   Weight: 95.9 kg (211 lb 8.5 oz)   Height: 5' 2" (1.575 m)     Body mass index is 38.69 kg/m².  Physical Exam  Vitals reviewed.   Constitutional:       Appearance: Normal appearance. She is well-developed and well-groomed.   Cardiovascular:      Rate and Rhythm: Normal rate and regular rhythm.   Pulmonary:      Effort: Pulmonary effort is normal. No respiratory distress.      Breath sounds: No wheezing, rhonchi or rales.   Musculoskeletal:      Comments: Ambulates well without assistive devices.    Skin:     Coloration: Skin is not pale.   Neurological:      Mental Status: She is alert.      Comments: Frequently laughs and frequently holds out hand to hold or touch others hand.                Diagnoses and health risks identified today and associated recommendations/orders:    1. Encounter for Medicare annual wellness exam  - Ambulatory Referral/Consult to Enhanced Annual Wellness Visit (eAWV)    2. Encounter for preventive health examination  Screenings performed, as noted above. Personal preventative testing needs reviewed.     3. Class 2 obesity due to excess calories " without serious comorbidity with body mass index (BMI) of 38.0 to 38.9 in adult  Work on diet modification and increase in physical activity as tolerated.   Followed by PCP.     4. Sedative, hypnotic or anxiolytic dependence, uncomplicated  5. Intractable epilepsy with status epilepticus, unspecified epilepsy type  6. Global developmental delay  7. Dysmetria  8. Autism  Chronic; stable on medication. Followed by Neurology.     9. Abnormality of gait and mobility  Stable. Ambulates well without ambulatory assistive devices. Use as needed. Followed by PCP.     10. Well woman exam  Mother request referral to see GYN to discuss Depo injection.   - Ambulatory referral/consult to Gynecology; Future      Review for Opioid Screening: Patient does not have rx for Opioids.    Review for Substance Use Disorders: Patient does not use substance.    Provided Deanna with a 5-10 year written screening schedule and personal prevention plan. Recommendations were developed using the USPSTF age appropriate recommendations. Education, counseling, and referrals were provided as needed. After Visit Summary printed and given to patient which includes a list of additional screenings\tests needed.    Follow up in about 1 year (around 12/4/2024) for your next annual wellness visit.    Kimberley Landvaerde NP      Advance Care Planning     I offered to discuss advanced care planning, including how to pick a person who would make decisions for you if you were unable to make them for yourself, called a health care power of , and what kind of decisions you might make such as use of life sustaining treatments such as ventilators and tube feeding when faced with a life limiting illness recorded on a living will that they will need to know. (How you want to be cared for as you near the end of your natural life)     X  Patient has advanced directives on file, which we reviewed, and they do not wish to make changes.

## 2023-12-05 PROBLEM — Z01.810 PREOP CARDIOVASCULAR EXAM: Status: RESOLVED | Noted: 2021-04-30 | Resolved: 2023-12-05

## 2023-12-05 PROBLEM — R94.31 NONSPECIFIC ABNORMAL ELECTROCARDIOGRAM (ECG) (EKG): Status: RESOLVED | Noted: 2021-04-30 | Resolved: 2023-12-05

## 2023-12-05 PROBLEM — R06.02 SOBOE (SHORTNESS OF BREATH ON EXERTION): Status: RESOLVED | Noted: 2021-04-30 | Resolved: 2023-12-05

## 2024-01-11 ENCOUNTER — OFFICE VISIT (OUTPATIENT)
Dept: OBSTETRICS AND GYNECOLOGY | Facility: CLINIC | Age: 34
End: 2024-01-11
Payer: MEDICARE

## 2024-01-11 VITALS
SYSTOLIC BLOOD PRESSURE: 123 MMHG | DIASTOLIC BLOOD PRESSURE: 88 MMHG | HEART RATE: 92 BPM | BODY MASS INDEX: 39.11 KG/M2 | WEIGHT: 213.88 LBS

## 2024-01-11 DIAGNOSIS — Z01.419 WELL WOMAN EXAM: ICD-10-CM

## 2024-01-11 DIAGNOSIS — Z30.42 ENCOUNTER FOR SURVEILLANCE OF INJECTABLE CONTRACEPTIVE: Primary | ICD-10-CM

## 2024-01-11 PROCEDURE — 1160F RVW MEDS BY RX/DR IN RCRD: CPT | Mod: HCNC,CPTII,S$GLB, | Performed by: OBSTETRICS & GYNECOLOGY

## 2024-01-11 PROCEDURE — G0101 CA SCREEN;PELVIC/BREAST EXAM: HCPCS | Mod: HCNC,GZ,S$GLB, | Performed by: OBSTETRICS & GYNECOLOGY

## 2024-01-11 PROCEDURE — 3079F DIAST BP 80-89 MM HG: CPT | Mod: HCNC,CPTII,S$GLB, | Performed by: OBSTETRICS & GYNECOLOGY

## 2024-01-11 PROCEDURE — 99999 PR PBB SHADOW E&M-EST. PATIENT-LVL III: CPT | Mod: PBBFAC,HCNC,, | Performed by: OBSTETRICS & GYNECOLOGY

## 2024-01-11 PROCEDURE — 3074F SYST BP LT 130 MM HG: CPT | Mod: HCNC,CPTII,S$GLB, | Performed by: OBSTETRICS & GYNECOLOGY

## 2024-01-11 PROCEDURE — 1159F MED LIST DOCD IN RCRD: CPT | Mod: HCNC,CPTII,S$GLB, | Performed by: OBSTETRICS & GYNECOLOGY

## 2024-01-11 RX ORDER — MEDROXYPROGESTERONE ACETATE 150 MG/ML
150 INJECTION, SUSPENSION INTRAMUSCULAR
Qty: 1 ML | Refills: 99 | Status: SHIPPED | OUTPATIENT
Start: 2024-04-01 | End: 2034-01-01

## 2024-01-11 NOTE — PROGRESS NOTES
CC: Annual check-up    SUBJECTIVE:   33 y.o. female   for annual routine Pap and checkup. No LMP recorded..  She has no unusual complaints and needs to get back on depo.    Not sexually active    Past Medical History:   Diagnosis Date    Autism disorder     Encounter for blood transfusion     Seizures     Strabismus      Past Surgical History:   Procedure Laterality Date    EYE SURGERY      STRABISMUS SURGERY       Social History     Socioeconomic History    Marital status: Single   Tobacco Use    Smoking status: Never     Passive exposure: Never    Smokeless tobacco: Never   Substance and Sexual Activity    Alcohol use: No    Drug use: No    Sexual activity: Never   Social History Narrative    22 yo  Female developmental delayed    Non-verbal          Social Determinants of Health     Financial Resource Strain: Medium Risk (2023)    Overall Financial Resource Strain (CARDIA)     Difficulty of Paying Living Expenses: Somewhat hard   Food Insecurity: No Food Insecurity (2023)    Hunger Vital Sign     Worried About Running Out of Food in the Last Year: Never true     Ran Out of Food in the Last Year: Never true   Transportation Needs: No Transportation Needs (2023)    PRAPARE - Transportation     Lack of Transportation (Medical): No     Lack of Transportation (Non-Medical): No   Physical Activity: Insufficiently Active (2023)    Exercise Vital Sign     Days of Exercise per Week: 2 days     Minutes of Exercise per Session: 30 min   Social Connections: Moderately Isolated (2023)    Social Connection and Isolation Panel [NHANES]     Frequency of Communication with Friends and Family: More than three times a week     Frequency of Social Gatherings with Friends and Family: Twice a week     Attends [employer] Services: More than 4 times per year     Active Member of Clubs or Organizations: No     Attends Club or Organization Meetings: Never     Marital Status: Never    Housing  Stability: Low Risk  (2023)    Housing Stability Vital Sign     Unable to Pay for Housing in the Last Year: No     Number of Places Lived in the Last Year: 1     Unstable Housing in the Last Year: No     Family History   Problem Relation Age of Onset    No Known Problems Mother     Diabetes Father     Hypertension Father     No Known Problems Sister     No Known Problems Brother     Cataracts Maternal Aunt     No Known Problems Maternal Uncle     Diabetes Paternal Aunt     Diabetes Paternal Uncle     No Known Problems Maternal Grandmother     No Known Problems Maternal Grandfather     Diabetes Paternal Grandmother     Hypertension Paternal Grandmother     No Known Problems Paternal Grandfather     Amblyopia Neg Hx     Blindness Neg Hx     Cancer Neg Hx     Glaucoma Neg Hx     Macular degeneration Neg Hx     Retinal detachment Neg Hx     Strabismus Neg Hx     Stroke Neg Hx     Thyroid disease Neg Hx      OB History    Para Term  AB Living   0 0 0 0 0 0   SAB IAB Ectopic Multiple Live Births   0 0 0 0           Current Outpatient Medications   Medication Sig Dispense Refill    cloBAZam (ONFI) 20 mg Tab Take 1 tablet (20 mg total) by mouth 2 (two) times daily. 60 tablet 5    lacosamide (VIMPAT) 200 mg Tab tablet Take 1 tablet (200 mg total) by mouth every 12 (twelve) hours. 180 tablet 1    vigabatrin (SABRIL) tablet Take 3 tablets (1,500 mg total) by mouth 2 (two) times daily. 180 tablet 5    [START ON 2024] medroxyPROGESTERone (DEPO-PROVERA) 150 mg/mL Syrg Inject 1 mL (150 mg total) into the muscle every 3 (three) months. Inject 1 mL (150 mg total) into the muscle every three months- intramuscular. 1 mL 99     No current facility-administered medications for this visit.     Allergies: Loratadine and Milk containing products (dairy)     ROS:  Constitutional: no weight loss, weight gain, fever, fatigue  Eyes:  No vision changes, glasses/contacts  ENT/Mouth: No ulcers, sinus problems, ears ringing,  headache  Cardiovascular: No inability to lie flat, chest pain, exercise intolerance, swelling, heart palpitations  Respiratory: No wheezing, coughing blood, shortness of breath, or cough  Gastrointestinal: No diarrhea, bloody stool, nausea/vomiting, constipation, gas, hemorrhoids  Genitourinary: No blood in urine, painful urination, urgency of urination, frequency of urination, incomplete emptying, incontinence, abnormal bleeding, painful periods, heavy periods, vaginal discharge, vaginal odor, painful intercourse, sexual problems, bleeding after intercourse.  Musculoskeletal: No muscle weakness  Skin/Breast: No painful breasts, nipple discharge, masses, rash, ulcers  Neurological: No passing out, seizures, numbness, headache  Endocrine: No diabetes, hypothyroid, hyperthyroid, hot flashes, hair loss, abnormal hair growth, ance  Psychiatric: No depression, crying  Hematologic: No bruises, bleeding, swollen lymph nodes, anemia.      OBJECTIVE:   The patient appears well, alert, oriented x 3, in no distress.  /88   Pulse 92   Wt 97 kg (213 lb 13.5 oz)   BMI 39.11 kg/m²   NECK: no thyromegaly, trachea midline  SKIN: no acne, striae, hirsutism        ASSESSMENT:   well woman  no contraindication to begin use of depo provera contraceptives  1. Well woman exam        PLAN:   return annually and q 3months to pharmacy for injections  Depo given in clinic today  Orders Placed This Encounter    medroxyPROGESTERone (DEPO-PROVERA) 150 mg/mL Syrg

## 2024-01-11 NOTE — PROGRESS NOTES
medroxyProgesterone given in clinic 01/11/2024    UPT- Negative  Location: right deltoid   Lot: [id number]  Expiration: 05/2025

## 2024-03-07 ENCOUNTER — TELEPHONE (OUTPATIENT)
Dept: OBSTETRICS AND GYNECOLOGY | Facility: CLINIC | Age: 34
End: 2024-03-07
Payer: MEDICARE

## 2024-03-07 DIAGNOSIS — Z30.42 ENCOUNTER FOR SURVEILLANCE OF INJECTABLE CONTRACEPTIVE: Primary | ICD-10-CM

## 2024-03-07 LAB
B-HCG UR QL: NEGATIVE
CTP QC/QA: YES

## 2024-03-07 PROCEDURE — 81025 URINE PREGNANCY TEST: CPT | Mod: S$GLB,,, | Performed by: OBSTETRICS & GYNECOLOGY

## 2024-04-23 DIAGNOSIS — G40.911 INTRACTABLE EPILEPSY WITH STATUS EPILEPTICUS, UNSPECIFIED EPILEPSY TYPE: ICD-10-CM

## 2024-04-23 RX ORDER — CLOBAZAM 20 MG/1
20 TABLET ORAL 2 TIMES DAILY
Qty: 60 TABLET | Refills: 5 | Status: SHIPPED | OUTPATIENT
Start: 2024-04-23

## 2024-04-23 RX ORDER — VIGABATRIN 500 MG/1
1500 TABLET ORAL 2 TIMES DAILY
Qty: 180 TABLET | Refills: 5 | Status: SHIPPED | OUTPATIENT
Start: 2024-04-23

## 2024-04-23 RX ORDER — LACOSAMIDE 200 MG/1
200 TABLET ORAL EVERY 12 HOURS
Qty: 180 TABLET | Refills: 1 | Status: SHIPPED | OUTPATIENT
Start: 2024-04-23

## 2024-04-23 NOTE — TELEPHONE ENCOUNTER
Clobazam 20 mg twice daily  Lacosamide 200 mg twice daily  Vigabatrin 1500 mg twice daily    Joan Velasquez MD PhD [facility]  Neurology-Epilepsy  [facility].
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
## 2024-08-29 ENCOUNTER — TELEPHONE (OUTPATIENT)
Dept: FAMILY MEDICINE | Facility: CLINIC | Age: 34
End: 2024-08-29
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
## 2024-10-18 ENCOUNTER — TELEPHONE (OUTPATIENT)
Dept: NEUROLOGY | Facility: CLINIC | Age: 34
End: 2024-10-18
Payer: MEDICARE

## 2024-10-18 NOTE — TELEPHONE ENCOUNTER
Spoke with patient's mother and explained we need to get Deanna seen ASAP to manage her controlled med refills. Dr. Velasquez no longer here. We have scheduled her with [facility] Monday 10/21/24, 1pm. Mom has requested we send an MD in person appointment request to the [facility], next available is fine since she is seeing Jeannine Monday.

## 2024-10-21 ENCOUNTER — OFFICE VISIT (OUTPATIENT)
Dept: NEUROLOGY | Facility: CLINIC | Age: 34
End: 2024-10-21
Payer: MEDICARE

## 2024-10-21 DIAGNOSIS — G40.911 INTRACTABLE EPILEPSY WITH STATUS EPILEPTICUS, UNSPECIFIED EPILEPSY TYPE: Primary | ICD-10-CM

## 2024-10-21 DIAGNOSIS — F84.0 AUTISM: ICD-10-CM

## 2024-10-21 DIAGNOSIS — F88 GLOBAL DEVELOPMENTAL DELAY: ICD-10-CM

## 2024-10-21 PROCEDURE — 99214 OFFICE O/P EST MOD 30 MIN: CPT | Mod: HCNC,95,,

## 2024-10-21 RX ORDER — LACOSAMIDE 200 MG/1
200 TABLET ORAL EVERY 12 HOURS
Qty: 180 TABLET | Refills: 1 | Status: SHIPPED | OUTPATIENT
Start: 2024-10-21 | End: 2025-04-19

## 2024-10-21 RX ORDER — CLOBAZAM 20 MG/1
20 TABLET ORAL 2 TIMES DAILY
Qty: 180 TABLET | Refills: 1 | Status: SHIPPED | OUTPATIENT
Start: 2024-10-21 | End: 2025-04-19

## 2024-10-21 RX ORDER — VIGABATRIN 500 MG/1
1500 TABLET ORAL 2 TIMES DAILY
Qty: 180 TABLET | Refills: 5 | Status: SHIPPED | OUTPATIENT
Start: 2024-10-21

## 2024-10-21 NOTE — PROGRESS NOTES
Name: Deanna Miles  MRN:[id number]   CSN: [id number]  Date of service: 10/21/2024  Age:34 y.o.   Gender:female   Referring Physician/Service: No referring provider defined for this encounter.   The patient is here today with:  [employer]     [facility]: Follow-up Visit    CHIEF COMPLAINT:  Autism and epilepsy    Interval Events/ROS 10/21/2024:    Current ASM/SEs:   Clobazam 20mg BID  Lacosamide 200mg BID   Vigabatrin 1500mg BID     Breakthrough seizures/events: stable frequency, 2x/week small events of confusion    Otherwise, no fever, no cold symptoms, no headache, no changes in vision, no new weakness, no chest pain, no shortness of breath, no nausea, no vomiting, no diarrhea, no constipation. ROS limited.     Recent Labs   Lab 03/28/22  1438 11/10/23  1507   Vigabatrin 10.2 11.2   Lacosamide 2.6 7.2   Clobazam 412.0 H 287.0   Desmethylclobazam 626.0 471.0       Interval Events/ROS 11/10/2023:    Current AED/SEs:  1. Vigabatrin 1500 mg twice daily  SE none   2. Lacosamide 200 mg twice daily  SE none    3. Clobazam 20 mg twice daily  SE none   Breakthrough seizures/events:  Big GTC last week. Sleeping for a couple of days afterwards but has been recoving well. Walked hunched over for several days with a right fist constriction which has resolved.  Still has staring spells daily.  Driving: no  Folic acid: no   Sleep: bed as soon as she eats, 6pm for 2 hours then up, back to bed at 11pm, wake at midnight, only sleeps a couple of hours after every time she eats  Mood: great, no outbursts or refusing    Otherwise, no fever, no cold symptoms, no indications of pain, no obvious changes in vision, no new weakness, no shortness of breath, no vomiting, no diarrhea, no constipation, no problems walking except for walking hunched over several days after a convulsion.    HPI 3/28/2022:     Age of first seizure: 1mo  Seizure Risk Factors:  Autism, paternal cousin with autism and seizures, no head strike with LOC,  No CNS infections, term  with no prolonged hospitalization, started seeing something was wrong around 4yo   Time of Last Seizure: last big seizure , last staring spell yesterday    # of lifetime Seizures: staring 1000s, maybe 100+ convulsions    Frequency of Seizures: at most 3 staring spells in 1 day, only 1 bigger event in a day, staring spells approximately three days a week, no bigger events since    Seizure Triggers:  When she gets excited  Injuries/Hospitalization for seizures? Lip bite no other injuries, ED maybe 15 times, admitted most of those times   Driving? No   Pregnancy? No   Contraception?  Depo-Provera shot  Folic Acid? No  Bone Health: no dexa      Auras: usually when she is excited but does not always lead to seizures,otherwise no good warning that a seizure is going to happen     Seizure Events:   1. Staring since , 1 minute, tired afterwards, as many as three in a row   2. As a small child - head jerking back and biting on her lip  3. Bigger events - collapse on the floor, arm jerking ()    Current AED/SEs:  1. Vigabatrin 1500 mg twice daily SE none   2. Lacosamide 200 mg once daily SE none   3. Clobazam 20 mg twice daily SE none     Previous AED/SEs or reason for DC.   1. Levetiracetam SE seizures through this   2. Phenobarbital SE when younger     EEG: showed bifrontal slowing     MRI: left MTS      Other Allergies:  Dairy, loratadine      AED compliance, adherence: does not refuse medications    ROS 3/28/2022:  Sporadic words (dog, daddy, her name).  Walks on her own but some balance problems. Only will eat with a spoon (fork rarely). Menstrual pain treated with tylenol. Melatonin causes her to be combative. No focal weakness. Will go the restroom on her own at home but diapers away from home. No yelling or behavioral issues. Poor sight.       EXAM:   - Vitals: There were no vitals taken for this visit.   Awake, eye contact, interactive, frequently reaches out to hold my  hand, plays with exam gloves, sporadic words (her name, jose eduardo, diane).  Antigravity x4.  Left eye exotropia.  No increased work of breathing.  Abdomen soft.  No commands but mimics.  Several single words, no phrases.  Frequently laughing.  Walks with good balance.      PLAN:  34-year-old woman with moderate-severe autism and longstanding epilepsy with a history of status epilepticus terminated with vigabatrin.  Continue lacosamide 200 mg twice daily, continue vigabatrin 1500 mg twice daily, continue clobazam 20 mg twice daily. Refills provided. Needs routine vision surveillance while on vigabatrin -> ophthalmology. Lab/levels next visit. Follow up in about 6 months (around 4/21/2025).     Problem List Items Addressed This Visit          Neuro    Autism    Global developmental delay    Intractable epilepsy with status epilepticus - Primary    Relevant Medications    vigabatrin (SABRIL) tablet    lacosamide (VIMPAT) 200 mg Tab tablet    cloBAZam (ONFI) 20 mg Tab     The patient location is: Home  The chief complaint leading to consultation is: Epilepsy  Visit type: Virtual visit with synchronous audio and video  Total time spent with patient: 9 minutes  Each patient to whom he or she provides medical services by telemedicine is:  (1) informed of the relationship between the physician and patient and the respective role of any other health care provider with respect to management of the patient; and (2) notified that he or she may decline to receive medical services by telemedicine and may withdraw from such care at any time.    Disclaimer: This note has been generated using voice-recognition software. There may be typographical errors that were missed during proof-reading.     LABS:  Recent Labs   Lab 03/28/22  1438 10/30/23  2245   WBC 4.69 8.25   Hemoglobin 12.4 12.9   Hematocrit 37.4 38.0   Platelets 370 416   Sodium 141 140   Potassium 4.0 3.9   BUN 10 9   Creatinine 0.7 0.7   eGFR if  >60.0  --     eGFR if non  >60.0  --        Recent Labs   Lab 03/28/22  1438 11/10/23  1507   Vigabatrin 10.2 11.2   Lacosamide 2.6 7.2   Clobazam 412.0 H 287.0   Desmethylclobazam 626.0 471.0        IMAGING:  Recent imaging is personally reviewed with the patient.    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        Electronically signed by: RAJESH DANIELS MD  Date:     12/17/13  Time:    08:40    Results for orders placed during the hospital encounter of 12/07/13    CT Head Without Contrast    Impression  1. Prominent lateral ventricles with a slightly abnormal configuration possibly a congenital abnormality.    2.  Nonspecific area of hypoattenuation within the right cerebellar hemisphere, may represent averaging from the peduncle, noting edema/infarction are considerations. Comparison with any prior studies would be helpful.  Clinical correlation advised. MRI  could be obtained for further evaluation as warranted.    3.  Sinus disease as above.  ______________________________________    Electronically signed by resident: Mary Gee  Date:     12/08/13  Time:    02:47          As the supervising and teaching physician, I personally reviewed the images and resident's interpretation and I agree with the findings.        Electronically signed by: MORGAN FLORES MD  Date:     12/08/13  Time:    03:07    PAST MEDICAL HISTORY:   Active Ambulatory Problems     Diagnosis Date Noted    Upper GI bleed (noted on 12/15/13) 12/15/2013    Breakthrough seizure 12/27/2013    Autism 12/27/2013    Gait instability  01/20/2014    Dysmetria 01/20/2014    UTI (lower urinary tract infection) 02/03/2014    High risk medication use - Both Eyes 03/18/2014    Dental caries 04/30/2021    Class 2 obesity due to excess calories without serious comorbidity with body mass index (BMI) of 38.0 to 38.9 in adult 04/30/2021    Global developmental delay 03/28/2022    Sedative, hypnotic or anxiolytic dependence, uncomplicated 08/29/2023    Intractable epilepsy with status epilepticus 11/10/2023     Resolved Ambulatory Problems     Diagnosis Date Noted    Status epilepticus 12/07/2013    SIRS (systemic inflammatory response syndrome) 12/09/2013    ARF (acute respiratory failure) 12/09/2013    Hypokalemia 12/09/2013    Electrolyte abnormality 12/09/2013    Hypernatremia 12/14/2013    Shock 12/14/2013    Dysphagia, oropharyngeal 12/18/2013    Tracheostomy care 12/27/2013    Aspiration pneumonia 12/27/2013    Candida UTI 12/27/2013    CHI (closed head injury) 01/20/2014    Closed head injury 01/20/2014    Right hemiparesis 01/20/2014    Dysphagia 01/21/2014    Nonspecific abnormal electrocardiogram (ECG) (EKG) 04/30/2021    SOBOE (shortness of breath on exertion) 04/30/2021    Preop cardiovascular exam 04/30/2021     Past Medical History:   Diagnosis Date    Autism disorder     Encounter for blood transfusion     Seizures     Strabismus         PAST SURGICAL HISTORY:   Past Surgical History:   Procedure Laterality Date    EYE SURGERY      STRABISMUS SURGERY          ALLERGIES: Loratadine and Milk containing products (dairy)   CURRENT MEDICATIONS:   Current Outpatient Medications   Medication Sig Dispense Refill    cloBAZam (ONFI) 20 mg Tab Take 1 tablet (20 mg total) by mouth 2 (two) times daily. 60 tablet 5    lacosamide (VIMPAT) 200 mg Tab tablet Take 1 tablet (200 mg total) by mouth every 12 (twelve) hours. 180 tablet 1    medroxyPROGESTERone (DEPO-PROVERA) 150 mg/mL Syrg Inject 1 mL into the muscle every 3 (three) months. 1 mL 99    vigabatrin  (SABRIL) tablet Take 3 tablets (1,500 mg total) by mouth 2 (two) times daily. 180 tablet 5     No current facility-administered medications for this visit.        FAMILY HISTORY:   Family History   Problem Relation Name Age of Onset    No Known Problems Mother      Diabetes Father      Hypertension Father      No Known Problems Sister      No Known Problems Brother      Cataracts Maternal Aunt      No Known Problems Maternal Uncle      Diabetes Paternal Aunt      Diabetes Paternal Uncle      No Known Problems Maternal Grandmother      No Known Problems Maternal Grandfather      Diabetes Paternal Grandmother      Hypertension Paternal Grandmother      No Known Problems Paternal Grandfather      Amblyopia Neg Hx      Blindness Neg Hx      Cancer Neg Hx      Glaucoma Neg Hx      Macular degeneration Neg Hx      Retinal detachment Neg Hx      Strabismus Neg Hx      Stroke Neg Hx      Thyroid disease Neg Hx           SOCIAL HISTORY:   Social History     Socioeconomic History    Marital status: Single   Tobacco Use    Smoking status: Never     Passive exposure: Never    Smokeless tobacco: Never   Substance and Sexual Activity    Alcohol use: No    Drug use: No    Sexual activity: Never   Social History Narrative    22 yo  Female developmental delayed    Non-verbal          Social Drivers of Health     Financial Resource Strain: Medium Risk (12/4/2023)    Overall Financial Resource Strain (CARDIA)     Difficulty of Paying Living Expenses: Somewhat hard   Food Insecurity: No Food Insecurity (12/4/2023)    Hunger Vital Sign     Worried About Running Out of Food in the Last Year: Never true     Ran Out of Food in the Last Year: Never true   Transportation Needs: No Transportation Needs (12/4/2023)    PRAPARE - Transportation     Lack of Transportation (Medical): No     Lack of Transportation (Non-Medical): No   Physical Activity: Insufficiently Active (12/4/2023)    Exercise Vital Sign     Days of Exercise per Week: 2 days      Minutes of Exercise per Session: 30 min   Housing Stability: Low Risk  (12/4/2023)    Housing Stability Vital Sign     Unable to Pay for Housing in the Last Year: No     Number of Places Lived in the Last Year: 1     Unstable Housing in the Last Year: No      Questions and concerns raised by the patient and family/care-giver(s) were addressed and they indicated understanding of everything discussed and agreed to plans as above.    Mandi Soliatrio PA-C   Neurology-Epilepsy  [facility]    Collaborating physician, Dr. Van Bosch, was available during today's encounter.     I spent approximately 23 minutes on the day of this encounter preparing to see the patient, obtaining and reviewing history and results, performing a medically appropriate exam, counseling and educating the patient/family/caregiver, documenting clinical information, coordinating care, and ordering medications, tests, procedures, and referrals.

## 2024-11-26 ENCOUNTER — OFFICE VISIT (OUTPATIENT)
Dept: FAMILY MEDICINE | Facility: CLINIC | Age: 34
End: 2024-11-26
Payer: MEDICARE

## 2024-11-26 VITALS
SYSTOLIC BLOOD PRESSURE: 114 MMHG | HEIGHT: 62 IN | OXYGEN SATURATION: 97 % | WEIGHT: 212.44 LBS | DIASTOLIC BLOOD PRESSURE: 72 MMHG | HEART RATE: 99 BPM | BODY MASS INDEX: 39.09 KG/M2 | TEMPERATURE: 98 F

## 2024-11-26 DIAGNOSIS — E66.01 MORBID (SEVERE) OBESITY DUE TO EXCESS CALORIES: ICD-10-CM

## 2024-11-26 DIAGNOSIS — G40.911 INTRACTABLE EPILEPSY WITH STATUS EPILEPTICUS, UNSPECIFIED EPILEPSY TYPE: ICD-10-CM

## 2024-11-26 DIAGNOSIS — F13.20 SEDATIVE, HYPNOTIC OR ANXIOLYTIC DEPENDENCE, UNCOMPLICATED: ICD-10-CM

## 2024-11-26 DIAGNOSIS — Z00.00 WELLNESS EXAMINATION: Primary | ICD-10-CM

## 2024-11-26 DIAGNOSIS — F84.0 AUTISM: ICD-10-CM

## 2024-11-26 PROCEDURE — 99395 PREV VISIT EST AGE 18-39: CPT | Mod: S$GLB,,, | Performed by: STUDENT IN AN ORGANIZED HEALTH CARE EDUCATION/TRAINING PROGRAM

## 2024-11-26 PROCEDURE — 3078F DIAST BP <80 MM HG: CPT | Mod: CPTII,S$GLB,, | Performed by: STUDENT IN AN ORGANIZED HEALTH CARE EDUCATION/TRAINING PROGRAM

## 2024-11-26 PROCEDURE — 3074F SYST BP LT 130 MM HG: CPT | Mod: CPTII,S$GLB,, | Performed by: STUDENT IN AN ORGANIZED HEALTH CARE EDUCATION/TRAINING PROGRAM

## 2024-11-26 PROCEDURE — 1159F MED LIST DOCD IN RCRD: CPT | Mod: CPTII,S$GLB,, | Performed by: STUDENT IN AN ORGANIZED HEALTH CARE EDUCATION/TRAINING PROGRAM

## 2024-11-26 PROCEDURE — 3008F BODY MASS INDEX DOCD: CPT | Mod: CPTII,S$GLB,, | Performed by: STUDENT IN AN ORGANIZED HEALTH CARE EDUCATION/TRAINING PROGRAM

## 2024-11-26 PROCEDURE — 1160F RVW MEDS BY RX/DR IN RCRD: CPT | Mod: CPTII,S$GLB,, | Performed by: STUDENT IN AN ORGANIZED HEALTH CARE EDUCATION/TRAINING PROGRAM

## 2024-11-26 NOTE — PROGRESS NOTES
" Patient ID: Deanna Miles is a 34 y.o. female.     Chief Complaint: Follow-up (90 L)    Patient here accompanied by her mother.     Follow-up      History of Present Illness    Deanna presents today for completion of a 90L form.    She denies any new problems, concerns, or medications since the last visit.         Review of Systems  Review of Systems   Unable to perform ROS: Patient nonverbal       Currently Medications  Current Outpatient Medications on File Prior to Visit   Medication Sig Dispense Refill    cloBAZam (ONFI) 20 mg Tab Take 1 tablet (20 mg total) by mouth 2 (two) times daily. 180 tablet 1    lacosamide (VIMPAT) 200 mg Tab tablet Take 1 tablet (200 mg total) by mouth every 12 (twelve) hours. 180 tablet 1    medroxyPROGESTERone (DEPO-PROVERA) 150 mg/mL Syrg Inject 1 mL into the muscle every 3 (three) months. 1 mL 99    vigabatrin (SABRIL) tablet Take 3 tablets (1,500 mg total) by mouth 2 (two) times daily. 180 tablet 5     No current facility-administered medications on file prior to visit.       Physical  Exam  Vitals:    11/26/24 0816   BP: 114/72   BP Location: Right arm   Patient Position: Sitting   Pulse: 99   Temp: 97.9 °F (36.6 °C)   TempSrc: Oral   SpO2: 97%   Weight: 96.3 kg (212 lb 6.6 oz)   Height: 5' 2" (1.575 m)      Body mass index is 38.85 kg/m².  Wt Readings from Last 3 Encounters:   11/26/24 96.3 kg (212 lb 6.6 oz)   01/11/24 97 kg (213 lb 13.5 oz)   12/04/23 95.9 kg (211 lb 8.5 oz)       Physical Exam  Vitals and nursing note reviewed.   Constitutional:       General: She is not in acute distress.     Appearance: She is obese. She is not ill-appearing.   HENT:      Head: Normocephalic and atraumatic.      Right Ear: External ear normal.      Left Ear: External ear normal.      Nose: Nose normal.      Mouth/Throat:      Mouth: Mucous membranes are moist.   Eyes:      Extraocular Movements: Extraocular movements intact.      Conjunctiva/sclera: Conjunctivae normal. " "  Cardiovascular:      Rate and Rhythm: Normal rate and regular rhythm.      Pulses: Normal pulses.      Heart sounds: No murmur heard.  Pulmonary:      Effort: Pulmonary effort is normal. No respiratory distress.      Breath sounds: No wheezing.   Abdominal:      General: There is no distension.      Palpations: Abdomen is soft. There is no mass.      Tenderness: There is no abdominal tenderness.   Musculoskeletal:         General: No swelling.      Cervical back: Normal range of motion.   Skin:     Coloration: Skin is not jaundiced.      Findings: No rash.   Neurological:      Mental Status: She is alert. Mental status is at baseline.      Motor: No weakness.   Psychiatric:         Mood and Affect: Mood normal.         Thought Content: Thought content normal.         Labs:    Complete Blood Count  Lab Results   Component Value Date    RBC 4.18 10/30/2023    HGB 12.9 10/30/2023    HCT 38.0 10/30/2023    MCV 91 10/30/2023    MCH 30.9 10/30/2023    MCHC 33.9 10/30/2023    RDW 12.0 10/30/2023     10/30/2023    MPV 9.9 10/30/2023    GRAN 6.6 10/30/2023    GRAN 80.6 (H) 10/30/2023    LYMPH 1.1 10/30/2023    LYMPH 13.6 (L) 10/30/2023    MONO 0.4 10/30/2023    MONO 5.3 10/30/2023    EOS 0.0 10/30/2023    BASO 0.02 10/30/2023    EOSINOPHIL 0.1 10/30/2023    BASOPHIL 0.2 10/30/2023    DIFFMETHOD Automated 10/30/2023       Comprehensive Metabolic Panel  No results found for: "GLU", "BUN", "CREATININE", "NA", "K", "CL", "PROT", "ALBUMIN", "BILITOT", "AST", "ALKPHOS", "CO2", "ALT", "ANIONGAP", "EGFRNONAA", "ESTGFRAFRICA"    TSH  No results found for: "TSH"    Imaging:  Echo Color Flow Doppler? Yes; Bubble Contrast? No  · The left ventricle is normal in size with concentric remodeling and   normal systolic function.  · The estimated ejection fraction is 55%.  · Normal left ventricular diastolic function.  · Normal right ventricular size with normal right ventricular systolic   function.   "       Assessment/Plan:  Assessment & Plan    Reviewed patient's 90L form for completion  Performed brief physical exam including lung and abdominal auscultation    ADMINISTRATIVE PROCEDURES:  - Follow up with patient to bring completed 90L form to office.         1. Wellness examination    2. Autism    3. Intractable epilepsy with status epilepticus, unspecified epilepsy type    4. Morbid (severe) obesity due to excess calories  Assessment & Plan:  Body mass index is 38.85 kg/m².  Discussed diet and exercise      5. Sedative, hypnotic or anxiolytic dependence, uncomplicated  Assessment & Plan:  - chronic  - taking medications as prescribed           Discussed how to stay healthy including: diet, exercise, refraining from smoking and discussed screening exams / tests needed for age, sex and family Hx.      Lincoln Cleveland MD    This note was generated with the assistance of ambient listening technology. Verbal consent was obtained by the patient and accompanying visitor(s) for the recording of patient appointment to facilitate this note. I attest to having reviewed and edited the generated note for accuracy, though some syntax or spelling errors may persist. Please contact the author of this note for any clarification.

## 2024-11-27 NOTE — PROGRESS NOTES
[facility]  Clinic Note    Date of Service: 11/29/2024  Patient seen at the request of: No ref. provider found    Reason for Consultation  epilepsy    Assessment:  Deanna Miles is a 34 y.o. female who presents with moderate-severe autism and longstanding epilepsy with a history of status epilepticus terminated with vigabatrin.    Patient's mother is here to provide history.  She is currently happy with the patient's regimen and notes witnessing one staring spell per month, last convulsive seizure over a year ago.  She attests that vigabatrin has been the most efficacious medication so far.    Although the patient has evidence of left mesial temporal sclerosis and some breakthrough seizures, epilepsy surgery does not appear to be within the patient's goals at this moment.  She is significantly disabled and spends days at adult .  No mood issues reported, and the patient is frequently laughing during the our interview.    Patient is at risk for status epilepticus due to her history of status epilepticus.      Plan:    - Vigabatrin 1500 mg twice daily  - Lacosamide 200 mg once daily  - Clobazam 20 mg twice daily    - referral to ophthalmology (to assess for visual field deficit)      - RTC in 1 year    A dictation device was used to produce this document. Use of such devices sometimes results in grammatical errors or replacement of words that sound similarly.     Signed:    Sam Garcia MD  Neurology/Epilepsy  11/29/2024 2:34 PM            HPI:  Deanna Miles is a 34 y.o. female with   Past Medical History:   Diagnosis Date    Autism disorder     Encounter for blood transfusion     Seizures     Strabismus      Staring spells are seen at least once per month.  After staring episodes, the patient will be confused for 5-10 minutes.  Last convulsion was over a year ago.      Mother reports patient has cramping about once per month that appears to be significant abdominal pain.  Mother reports that  the patient is less talkative than she used to be.      Patient goes to adult day care during the day.        Per neurology note:  33-year-old woman with moderate-severe autism and longstanding epilepsy with a history of status epilepticus terminated with vigabatrin.  Continue lacosamide to 200 mg twice daily, continue vigabatrin 1500 mg twice daily, continue clobazam 20 mg twice daily.  Needs routine vision surveillance while on vigabatrin -> referral to ophthalmology. Lab/levels today. No follow-ups on file.     Age of first seizure: 1mo  Seizure Risk Factors:  Autism, paternal cousin with autism and seizures, no head strike with LOC, No CNS infections, term  with no prolonged hospitalization, started seeing something was wrong around 2yo   Time of Last Seizure: last big seizure , last staring spell yesterday    # of lifetime Seizures: staring 1000s, maybe 100+ convulsions    Frequency of Seizures: at most 3 staring spells in 1 day, only 1 bigger event in a day, staring spells approximately three days a week, no bigger events since    Seizure Triggers:  When she gets excited  Injuries/Hospitalization for seizures? Lip bite no other injuries, ED maybe 15 times, admitted most of those times   Driving? No   Pregnancy? No   Contraception?  Depo-Provera shot  Folic Acid? No  Bone Health: no dexa      Auras: usually when she is excited but does not always lead to seizures,otherwise no good warning that a seizure is going to happen      Seizure Events:   1. Staring since , 1 minute, tired afterwards, as many as three in a row   2. As a small child - head jerking back and biting on her lip  3. Bigger events - collapse on the floor, arm jerking ()     Current AED/SEs:  1. Vigabatrin 1500 mg twice daily SE none   2. Lacosamide 200 mg once daily SE none   3. Clobazam 20 mg twice daily SE none      Previous AED/SEs or reason for DC.   1. Levetiracetam SE seizures through this   2. Phenobarbital SE when  younger      EEG: showed bifrontal slowing     MRI: left MTS 2013     This is the extent of the patient's complaints at this time.    TSH   Date Value Ref Range Status   06/09/2020 0.913 0.400 - 4.000 uIU/mL Final     Comment:     Warning:  Heterophilic antibodies in serum or plasma of   certain individuals are known to cause interference with   immunoassays. These antibodies may be present in blood samples   from individuals regularly exposed to animal or who have been   treated with animal products.   Patients taking high doses of supplemental biotin may have  negatively biased results.      04/20/2017 1.65 mIU/L Final     Comment:               Reference Range                         > or = 20 Years  0.40-4.50                              Pregnancy Ranges            First trimester    0.26-2.66            Second trimester   0.55-2.73            Third trimester    0.43-2.91       Vitamin B-12   Date Value Ref Range Status   09/03/2014 355 210 - 950 pg/mL Final     Hemoglobin A1C   Date Value Ref Range Status   12/07/2013 5.7 4.5 - 6.2 % Final         Review of Systems:  ROS negative unless noted in HPI    Past Surgical History:  Past Surgical History:   Procedure Laterality Date    EYE SURGERY      STRABISMUS SURGERY         Family History:  Family History   Problem Relation Name Age of Onset    No Known Problems Mother      Diabetes Father      Hypertension Father      No Known Problems Sister      No Known Problems Brother      Cataracts Maternal Aunt      No Known Problems Maternal Uncle      Diabetes Paternal Aunt      Diabetes Paternal Uncle      No Known Problems Maternal Grandmother      No Known Problems Maternal Grandfather      Diabetes Paternal Grandmother      Hypertension Paternal Grandmother      No Known Problems Paternal Grandfather      Amblyopia Neg Hx      Blindness Neg Hx      Cancer Neg Hx      Glaucoma Neg Hx      Macular degeneration Neg Hx      Retinal detachment Neg Hx      Strabismus Neg  "Hx      Stroke Neg Hx      Thyroid disease Neg Hx         Social History:  Social History     Tobacco Use    Smoking status: Never     Passive exposure: Never    Smokeless tobacco: Never   Substance Use Topics    Alcohol use: No    Drug use: No       Allergies:  Loratadine and Milk containing products (dairy)    Outpatient Medications:  Prior to Admission medications    Medication Sig Start Date End Date Taking? Authorizing Provider   cloBAZam (ONFI) 20 mg Tab Take 1 tablet (20 mg total) by mouth 2 (two) times daily. 10/21/24 4/19/25  Mandi Solitario PA-C   lacosamide (VIMPAT) 200 mg Tab tablet Take 1 tablet (200 mg total) by mouth every 12 (twelve) hours. 10/21/24 4/19/25  Mandi Solitario PA-C   medroxyPROGESTERone (DEPO-PROVERA) 150 mg/mL Syrg Inject 1 mL into the muscle every 3 (three) months. 4/1/24 1/1/34  Jesus Haines MD   vigabatrin (SABRIL) tablet Take 3 tablets (1,500 mg total) by mouth 2 (two) times daily. 10/21/24   Mandi Solitario PA-C       Physical exam:    Vitals: BP (!) 128/90 (Patient Position: Sitting)   Pulse 84   Ht 5' 2" (1.575 m)   Wt 95.4 kg (210 lb 3.3 oz)   LMP  (LMP Unknown)   BMI 38.45 kg/m²     General:   Sitting in chair, in no distress, well-nourished, well-developed, appears stated age.  Head/Neck:   Normocephalic,atraumatic  Pulm:  Non-labored breathing     Mental Status: Alert.  Laughs frequently.  Attends to mother and I.  Able to mimic but does not follow commands.  Cannot reliably repeat words.  Was not able to tell us her name.    CN:   III, IV, VI: OS exotropia  VII: Facial movement full and symmetric.   VIII: Hearing grossly normal to conversation.  Motor: Normal bulk throughout all four extremities.   LUE: antigravity  RUE: antigravity  LLE: antigravity  RLE: antigravity  No tremors   Sensory: Intact and symmetric to light touch throughout.  Reflexes: LUE: Biceps 2+ brachioradialis 2+  RUE: Biceps 2+, brachioradialis 2+  LLE: Knee 2+, ankle 2+  RLE: Knee 2+, " ankle 2+  Coordination:  Intact and symmetric when reaching out with upper extremities.  Gait:  Intact to casual gait.    Imaging:  All pertinent imaging was personally reviewed.    On my personal review:   Visualized hypotrophic left mesial temporal parenchyma.      Results for orders placed during the hospital encounter of 12/07/13    MRI Brain W WO Contrast    Narrative  MRI OF THE BRAIN WITH ANDWITHOUT CONTRAST:    Technique: Noncontrast sagittal T1, axial T1, axial T2, axial FLAIR, and axial diffusion weighted images of the brain were obtained. Additionally, T1 postcontrast axial, sagittal, and coronal sequences were acquired following the intravenous  administration of 10 cc Gadavist.    Comparison: None.    Findings:    The left hippocampal region appears slightly hypotrophic as compared to the right suggesting mesotemporal sclerosis. No additional lesions suspicious for possible seizure foci are demonstrated.    There is no restricted diffusion to indicate acute infarct.    The brain parenchyma has normal signal characteristics.  Prominent diffuse sulcal/vascular enhancement and FLAIR signal are most likely related to the effects of anesthesia. There is no hemorrhage or extra-axial collection.  The ventricles are normal in  size and configuration.  The pituitary, brainstem, and cerebellum are unremarkable.  The major intracranial flow voids are patent.    There is partial opacification of the bilateral mastoid air cells are  Orbits and calvarium are unremarkable.  IMPRESSION:      The findings in keeping with mesotemporal sclerosis on the left  ______________________________________    Electronically signed by resident: DOMINGO BISWAS MD  Date:     12/13/13  Time:    16:50          As the supervising and teaching physician, I personally reviewed the images and resident's interpretation and I agree with the findings.        Electronically signed by: RAJESH DANIELS MD  Date:     12/17/13  Time:    08:40      I  spent a total of 23 minutes on the day of the visit. This includes face to face time and non-face to face time preparing to see the patient (eg, review of tests), obtaining and/or reviewing separately obtained history, documenting clinical information in the electronic or other health record, independently interpreting results and communicating results to the patient/family/caregiver, or care coordinator.

## 2024-11-29 ENCOUNTER — OFFICE VISIT (OUTPATIENT)
Dept: NEUROLOGY | Facility: CLINIC | Age: 34
End: 2024-11-29
Payer: MEDICARE

## 2024-11-29 VITALS
SYSTOLIC BLOOD PRESSURE: 128 MMHG | BODY MASS INDEX: 38.68 KG/M2 | HEIGHT: 62 IN | WEIGHT: 210.19 LBS | HEART RATE: 84 BPM | DIASTOLIC BLOOD PRESSURE: 90 MMHG

## 2024-11-29 DIAGNOSIS — G40.911 INTRACTABLE EPILEPSY WITH STATUS EPILEPTICUS, UNSPECIFIED EPILEPSY TYPE: ICD-10-CM

## 2024-11-29 PROCEDURE — 99999 PR PBB SHADOW E&M-EST. PATIENT-LVL III: CPT | Mod: PBBFAC,HCNC,, | Performed by: STUDENT IN AN ORGANIZED HEALTH CARE EDUCATION/TRAINING PROGRAM

## 2025-01-13 DIAGNOSIS — Z00.00 ENCOUNTER FOR MEDICARE ANNUAL WELLNESS EXAM: ICD-10-CM

## 2025-02-13 RX ORDER — MEDROXYPROGESTERONE ACETATE 150 MG/ML
150 INJECTION, SUSPENSION INTRAMUSCULAR
Qty: 1 ML | Status: SHIPPED | OUTPATIENT
Start: 2025-02-13 | End: 2034-11-15

## 2025-02-14 ENCOUNTER — OFFICE VISIT (OUTPATIENT)
Dept: OBSTETRICS AND GYNECOLOGY | Facility: CLINIC | Age: 35
End: 2025-02-14
Payer: MEDICARE

## 2025-02-14 DIAGNOSIS — Z30.42 ENCOUNTER FOR SURVEILLANCE OF INJECTABLE CONTRACEPTIVE: Primary | ICD-10-CM

## 2025-02-14 PROCEDURE — 99999 PR PBB SHADOW E&M-EST. PATIENT-LVL II: CPT | Mod: PBBFAC,HCNC,, | Performed by: OBSTETRICS & GYNECOLOGY

## 2025-02-14 RX ORDER — MEDROXYPROGESTERONE ACETATE 150 MG/ML
150 INJECTION, SUSPENSION INTRAMUSCULAR
Qty: 1 ML | Status: SHIPPED | OUTPATIENT
Start: 2025-02-14 | End: 2034-11-16

## 2025-02-14 NOTE — PROGRESS NOTES
CC:  Chief Complaint   Patient presents with    rx refill       HPI:    34 y.o.   OB History          0    Para   0    Term   0       0    AB   0    Living   0         SAB   0    IAB   0    Ectopic   0    Multiple   0    Live Births                   Complaining of: here to get refill on depo, no problems    (Not in a hospital admission)      Review of patient's allergies indicates:   Allergen Reactions    Loratadine Diarrhea     All allergy medicines    Milk containing products (dairy) Diarrhea        Past Medical History:   Diagnosis Date    Autism disorder     Encounter for blood transfusion     Seizures     Strabismus      Past Surgical History:   Procedure Laterality Date    EYE SURGERY      STRABISMUS SURGERY       Family History   Problem Relation Name Age of Onset    No Known Problems Mother      Diabetes Father      Hypertension Father      No Known Problems Sister      No Known Problems Brother      Cataracts Maternal Aunt      No Known Problems Maternal Uncle      Diabetes Paternal Aunt      Diabetes Paternal Uncle      No Known Problems Maternal Grandmother      No Known Problems Maternal Grandfather      Diabetes Paternal Grandmother      Hypertension Paternal Grandmother      No Known Problems Paternal Grandfather      Amblyopia Neg Hx      Blindness Neg Hx      Cancer Neg Hx      Glaucoma Neg Hx      Macular degeneration Neg Hx      Retinal detachment Neg Hx      Strabismus Neg Hx      Stroke Neg Hx      Thyroid disease Neg Hx       Social History     Tobacco Use    Smoking status: Never     Passive exposure: Never    Smokeless tobacco: Never   Substance Use Topics    Alcohol use: No    Drug use: No     ROS:  GENERAL: Feeling well overall. Denies fever or chills.   SKIN: Denies rash or lesions.   HEAD: Denies head injury or headache.   NODES: Denies enlarged lymph nodes.   CHEST: Denies chest pain or shortness of breath.   CARDIOVASCULAR: Denies palpitations or left sided chest pain.     ABDOMEN: Denies diarrhea, nausea, vomiting or rectal bleeding.   URINARY: No dysuria, hematuria, or burning on urination.  REPRODUCTIVE: See HPI.   BREASTS: Denies pain, lumps, or nipple discharge.   HEMATOLOGIC: No easy bruisability or excessive bleeding.   MUSCULOSKELETAL: Denies joint pain or swelling.   NEUROLOGIC: Denies syncope or weakness.   PSYCHIATRIC: Denies depression, anxiety or mood swings.      PE: There were no vitals taken for this visit.     APPEARANCE: Well nourished, well developed, in no acute distress.  SKIN: Normal skin turgor, no lesions.  NECK: Neck symmetric without masses or thyromegaly.  NODES: No inguinal, cervical, axillary or femoral lymph node enlargement.      ASSESSMENT/ PLAN    Deanna was seen today for rx refill.    Diagnoses and all orders for this visit:    Encounter for surveillance of injectable contraceptive    Other orders  -     medroxyPROGESTERone (DEPO-PROVERA) 150 mg/mL Syrg; Inject 1 mL into the muscle every 3 (three) months.            Jesus Haines MD

## 2025-04-28 DIAGNOSIS — G40.911 INTRACTABLE EPILEPSY WITH STATUS EPILEPTICUS, UNSPECIFIED EPILEPSY TYPE: ICD-10-CM

## 2025-04-28 RX ORDER — LACOSAMIDE 200 MG/1
200 TABLET ORAL EVERY 12 HOURS
Qty: 180 TABLET | Refills: 1 | Status: SHIPPED | OUTPATIENT
Start: 2025-04-28 | End: 2025-10-25

## 2025-04-28 RX ORDER — CLOBAZAM 20 MG/1
20 TABLET ORAL 2 TIMES DAILY
Qty: 180 TABLET | Refills: 1 | Status: SHIPPED | OUTPATIENT
Start: 2025-04-28 | End: 2025-10-25

## 2025-05-12 DIAGNOSIS — G40.911 INTRACTABLE EPILEPSY WITH STATUS EPILEPTICUS, UNSPECIFIED EPILEPSY TYPE: ICD-10-CM

## 2025-05-13 RX ORDER — VIGABATRIN 500 MG/1
TABLET ORAL
Qty: 180 TABLET | Refills: 5 | Status: SHIPPED | OUTPATIENT
Start: 2025-05-13

## 2025-05-16 ENCOUNTER — TELEPHONE (OUTPATIENT)
Dept: NEUROLOGY | Facility: CLINIC | Age: 35
End: 2025-05-16
Payer: MEDICARE

## 2025-05-27 ENCOUNTER — TELEPHONE (OUTPATIENT)
Dept: FAMILY MEDICINE | Facility: CLINIC | Age: 35
End: 2025-05-27
Payer: MEDICARE

## 2025-05-27 NOTE — TELEPHONE ENCOUNTER
LVM for pts mother to return call regarding pts form. Form is in front office ready to be picked up or sent.     Waiting for fax number from pts mother

## 2025-05-27 NOTE — TELEPHONE ENCOUNTER
Jelly Swartz [facility] Staff  Caller: Unspecified (Today,  1:48 PM)  Type:  Patient Returning Call    Who Called:Pt  Would the patient rather a call back or a response via [facility]? Call back  Best Call Back Number: [phone]  Additional Information: checking on the status of paperwork faxed to the office end of last week

## 2025-05-28 ENCOUNTER — PATIENT MESSAGE (OUTPATIENT)
Dept: FAMILY MEDICINE | Facility: CLINIC | Age: 35
End: 2025-05-28
Payer: MEDICARE

## 2025-07-21 ENCOUNTER — OFFICE VISIT (OUTPATIENT)
Dept: FAMILY MEDICINE | Facility: CLINIC | Age: 35
End: 2025-07-21
Payer: MEDICARE

## 2025-07-21 VITALS
HEART RATE: 102 BPM | TEMPERATURE: 98 F | DIASTOLIC BLOOD PRESSURE: 78 MMHG | HEIGHT: 62 IN | SYSTOLIC BLOOD PRESSURE: 128 MMHG | BODY MASS INDEX: 36.07 KG/M2 | OXYGEN SATURATION: 97 % | WEIGHT: 196 LBS

## 2025-07-21 DIAGNOSIS — F88 GLOBAL DEVELOPMENTAL DELAY: ICD-10-CM

## 2025-07-21 DIAGNOSIS — R26.81 GAIT INSTABILITY: ICD-10-CM

## 2025-07-21 DIAGNOSIS — E66.09 CLASS 2 OBESITY DUE TO EXCESS CALORIES WITHOUT SERIOUS COMORBIDITY WITH BODY MASS INDEX (BMI) OF 35.0 TO 35.9 IN ADULT: ICD-10-CM

## 2025-07-21 DIAGNOSIS — Z00.00 ENCOUNTER FOR PREVENTIVE HEALTH EXAMINATION: ICD-10-CM

## 2025-07-21 DIAGNOSIS — E66.812 CLASS 2 OBESITY DUE TO EXCESS CALORIES WITHOUT SERIOUS COMORBIDITY WITH BODY MASS INDEX (BMI) OF 35.0 TO 35.9 IN ADULT: ICD-10-CM

## 2025-07-21 DIAGNOSIS — G40.911 INTRACTABLE EPILEPSY WITH STATUS EPILEPTICUS, UNSPECIFIED EPILEPSY TYPE: ICD-10-CM

## 2025-07-21 DIAGNOSIS — Z00.00 ENCOUNTER FOR MEDICARE ANNUAL WELLNESS EXAM: Primary | ICD-10-CM

## 2025-07-21 DIAGNOSIS — Z59.41 FOOD INSECURITY: ICD-10-CM

## 2025-07-21 DIAGNOSIS — F13.20 SEDATIVE, HYPNOTIC OR ANXIOLYTIC DEPENDENCE, UNCOMPLICATED: ICD-10-CM

## 2025-07-21 DIAGNOSIS — Z79.899 HIGH RISK MEDICATION USE: ICD-10-CM

## 2025-07-21 DIAGNOSIS — F84.0 AUTISM: ICD-10-CM

## 2025-07-21 PROCEDURE — G0136 PR ADMINISTRATION, SOCIAL DETERMINANT ASSESSMNT, 5-15 MIN: HCPCS | Mod: S$GLB,,, | Performed by: PHYSICIAN ASSISTANT

## 2025-07-21 PROCEDURE — 1160F RVW MEDS BY RX/DR IN RCRD: CPT | Mod: CPTII,S$GLB,, | Performed by: PHYSICIAN ASSISTANT

## 2025-07-21 PROCEDURE — G9919 SCRN ND POS ND PROV OF REC: HCPCS | Mod: CPTII,S$GLB,, | Performed by: PHYSICIAN ASSISTANT

## 2025-07-21 PROCEDURE — G0439 PPPS, SUBSEQ VISIT: HCPCS | Mod: S$GLB,,, | Performed by: PHYSICIAN ASSISTANT

## 2025-07-21 PROCEDURE — 3074F SYST BP LT 130 MM HG: CPT | Mod: CPTII,S$GLB,, | Performed by: PHYSICIAN ASSISTANT

## 2025-07-21 PROCEDURE — 3078F DIAST BP <80 MM HG: CPT | Mod: CPTII,S$GLB,, | Performed by: PHYSICIAN ASSISTANT

## 2025-07-21 PROCEDURE — 1159F MED LIST DOCD IN RCRD: CPT | Mod: CPTII,S$GLB,, | Performed by: PHYSICIAN ASSISTANT

## 2025-07-21 SDOH — SOCIAL DETERMINANTS OF HEALTH (SDOH): FOOD INSECURITY: Z59.41

## 2025-07-21 NOTE — PROGRESS NOTES
"  Deanna Miles presented for a follow-up Medicare AWV today. The following components were reviewed and updated:    Medical history  Family History  Social history  Allergies and Current Medications  Health Risk Assessment  Health Maintenance  Care Team    **See Completed Assessments for Annual Wellness visit with in the encounter summary    The following assessments were completed:  Depression Screening  Cognitive function Screening  Timed Get Up Test  Whisper Test      Opioid documentation:      Patient does not have a current opioid prescription.          Vitals:    07/21/25 1608   BP: 128/78   BP Location: Right arm   Patient Position: Sitting   Pulse: 102   Temp: 98.1 °F (36.7 °C)   SpO2: 97%   Weight: 88.9 kg (195 lb 15.8 oz)   Height: 5' 2" (1.575 m)     Body mass index is 35.85 kg/m².       Physical Exam  Constitutional:       General: She is not in acute distress.     Appearance: Normal appearance. She is obese.   HENT:      Head: Normocephalic and atraumatic.   Eyes:      General: Lids are normal. Gaze aligned appropriately.      Pupils: Pupils are equal, round, and reactive to light.   Neck:      Trachea: Trachea normal.   Cardiovascular:      Rate and Rhythm: Normal rate and regular rhythm.      Heart sounds: S1 normal and S2 normal.   Pulmonary:      Effort: Pulmonary effort is normal.      Breath sounds: Normal breath sounds.   Abdominal:      General: Bowel sounds are normal. There is no distension.      Palpations: Abdomen is soft.      Tenderness: There is no abdominal tenderness.   Musculoskeletal:      Cervical back: Neck supple.      Right lower leg: No edema.      Left lower leg: No edema.   Lymphadenopathy:      Cervical: No cervical adenopathy.   Skin:     General: Skin is cool and dry.   Neurological:      Mental Status: She is alert and oriented to person, place, and time.   Psychiatric:         Attention and Perception: Attention normal.         Mood and Affect: Mood normal.         " Speech: She is noncommunicative.         Behavior: Behavior is cooperative.         Thought Content: Thought content normal.         Diagnoses and health risks identified today and associated recommendations/orders:    1. Encounter for Medicare annual wellness exam  - Chart reviewed. Problem list updated. Discussed current medical diagnosis, current medications, medical/surgical/family/social history; updated provider list; documented vital signs; identified any cognitive impairment; and updated risk factor list. Addressed any outstanding health maintenance. Provided patient with personalized health advice. Continue to follow up with PCP and any specialists.   Orders  - Ambulatory Referral/Consult to Enhanced Annual Wellness Visit (eAWV)    2. Encounter for preventive health examination  - Chart reviewed. Problem list updated. Discussed current medical diagnosis, current medications, medical/surgical/family/social history; updated provider list; documented vital signs; identified any cognitive impairment; and updated risk factor list. Addressed any outstanding health maintenance. Provided patient with personalized health advice. Continue to follow up with PCP and any specialists.     3. Global developmental delay  - Chronic, stable  - Continue to monitor  - Follow with PCP    4. Autism  - Chronic, stable  - Continue to monitor  - Follow with PCP    5. Intractable epilepsy with status epilepticus, unspecified epilepsy type  - Chronic, stable  - Continue lacosamide, vigabatrin, and clobazam as needed for breakthrough seizures  - Follow with neurology    6. Sedative, hypnotic or anxiolytic dependence, uncomplicated  - Chronic, stable  - Continue lacosamide, vigabatrin, and clobazam as needed for breakthrough seizures  - Follow with neurology    7. High risk medication use - Both Eyes  - Chronic, stable  - Continue medications  - Follow with PCP    8. Gait instability  - Chronic, stable  - Continue to ambulate with  care  - Follow with PCP    9. Food insecurity  Orders  - COMMUNITY HEALTH PROGRAM ENROLLMENT    10. Class 2 obesity due to excess calories without serious comorbidity with body mass index (BMI) of 35.0 to 35.9 in adult  - Body mass index is 35.85 kg/m².  - Recommendation for healthy diet and increasing exercise as tolerated with goal of 150min/week. Recommend weight loss.        Provided Mjjaun with a 5-10 year written screening schedule and personal prevention plan. Recommendations were developed using the USPSTF age appropriate recommendations. Education, counseling, and referrals were provided as needed.  After Visit Summary printed and given to patient which includes a list of additional screenings\tests needed.      SDoH Intervention:     As of today, the patient has reported risk in the following areas:    Food Insecurity: Food Insecurity Present (7/21/2025)    Hunger Vital Sign     Worried About Running Out of Food in the Last Year: Sometimes true     Ran Out of Food in the Last Year: Never true     Enrollment order placed to Community Health Worker.  Spent approximately 5 minutes assessing social needs impacting patient care.      Follow up:  11/5/2025 with PCP for annual and 90L      Advance Care Planning   I offered to discuss advanced care planning, including how to pick a person who would make decisions for you if you were unable to make them for yourself, called a health care power of , and what kind of decisions you might make such as use of life sustaining treatments such as ventilators and tube feeding when faced with a life limiting illness recorded on a living will that they will need to know. (How you want to be cared for as you near the end of your natural life)     X  Patient has advanced directives on file, which we reviewed, and they do not wish to make changes.      Minerva Chino PA-C

## 2025-07-21 NOTE — PROGRESS NOTES
[facility] Intervention:     As of today, the patient has reported risk in the following areas:    Food Insecurity: Food Insecurity Present (7/21/2025)    Hunger Vital Sign     Worried About Running Out of Food in the Last Year: Sometimes true     Ran Out of Food in the Last Year: Never true     Enrollment order placed to Community Health Worker.  Spent approximately 5 minutes assessing social needs impacting patient care.

## 2025-07-21 NOTE — PATIENT INSTRUCTIONS
Counseling and Referral of Other Preventative  (Italic type indicates deductible and co-insurance are waived)    Patient Name: Deanna Miles  Today's Date: 7/21/2025    Health Maintenance       Date Due Completion Date    Hepatitis C Screening Never done ---    HIV Screening Never done ---    TETANUS VACCINE Never done ---    COVID-19 Vaccine (1 - 2024-25 season) Never done ---    Influenza Vaccine (1) 09/01/2025 ---    RSV Vaccine (Age 60+ and Pregnant patients) (1 - 1-dose 75+ series) 09/01/2066 ---        No orders of the defined types were placed in this encounter.    The following information is provided to all patients.  This information is to help you find resources for any of the problems found today that may be affecting your health:                  Living healthy guide: www.[facility].[place].gov      Understanding Diabetes: www.diabetes.org      Eating healthy: www.cdc.gov/healthyweight      [facility] home safety checklist: www.cdc.gov/steadi/patient.html      Agency on Aging: www.goea.[place].[facility]      Alcoholics anonymous (AA): www.aa.org      Physical Activity: www.roney.nih.gov/qy2rtns      Tobacco use: www.quitwithusla.org

## 2025-07-22 ENCOUNTER — PATIENT OUTREACH (OUTPATIENT)
Dept: ADMINISTRATIVE | Facility: OTHER | Age: 35
End: 2025-07-22
Payer: MEDICARE